# Patient Record
Sex: FEMALE | Race: WHITE | NOT HISPANIC OR LATINO | Employment: UNEMPLOYED | URBAN - METROPOLITAN AREA
[De-identification: names, ages, dates, MRNs, and addresses within clinical notes are randomized per-mention and may not be internally consistent; named-entity substitution may affect disease eponyms.]

---

## 2023-11-10 ENCOUNTER — HOSPITAL ENCOUNTER (EMERGENCY)
Facility: HOSPITAL | Age: 23
Discharge: HOME | End: 2023-11-10
Attending: EMERGENCY MEDICINE
Payer: MEDICAID

## 2023-11-10 VITALS
HEART RATE: 61 BPM | WEIGHT: 156.53 LBS | OXYGEN SATURATION: 100 % | BODY MASS INDEX: 26.08 KG/M2 | DIASTOLIC BLOOD PRESSURE: 87 MMHG | HEIGHT: 65 IN | TEMPERATURE: 98.1 F | SYSTOLIC BLOOD PRESSURE: 120 MMHG | RESPIRATION RATE: 16 BRPM

## 2023-11-10 DIAGNOSIS — O02.1 MISSED ABORTION: Primary | ICD-10-CM

## 2023-11-10 LAB
ABO GROUP BLD: NORMAL
ALBUMIN SERPL-MCNC: 4.1 G/DL (ref 3.5–5.2)
ALBUMIN/GLOB SERPL: 1.3 G/DL
ALP SERPL-CCNC: 103 U/L (ref 39–117)
ALT SERPL W P-5'-P-CCNC: 13 U/L (ref 1–33)
ANION GAP SERPL CALCULATED.3IONS-SCNC: 9.3 MMOL/L (ref 5–15)
AST SERPL-CCNC: 19 U/L (ref 1–32)
BACTERIA UR QL AUTO: ABNORMAL /HPF
BASOPHILS # BLD AUTO: 0.09 10*3/MM3 (ref 0–0.2)
BASOPHILS NFR BLD AUTO: 1.5 % (ref 0–1.5)
BILIRUB SERPL-MCNC: 0.3 MG/DL (ref 0–1.2)
BILIRUB UR QL STRIP: NEGATIVE
BUN SERPL-MCNC: 8 MG/DL (ref 6–20)
BUN/CREAT SERPL: 11.8 (ref 7–25)
C TRACH RRNA CVX QL NAA+PROBE: NOT DETECTED
CALCIUM SPEC-SCNC: 9.3 MG/DL (ref 8.6–10.5)
CANDIDA SPECIES: NEGATIVE
CHLORIDE SERPL-SCNC: 103 MMOL/L (ref 98–107)
CLARITY UR: CLEAR
CO2 SERPL-SCNC: 24.7 MMOL/L (ref 22–29)
COLOR UR: YELLOW
CREAT SERPL-MCNC: 0.68 MG/DL (ref 0.57–1)
DEPRECATED RDW RBC AUTO: 40.1 FL (ref 37–54)
EGFRCR SERPLBLD CKD-EPI 2021: 124.9 ML/MIN/1.73
EOSINOPHIL # BLD AUTO: 0.13 10*3/MM3 (ref 0–0.4)
EOSINOPHIL NFR BLD AUTO: 2.2 % (ref 0.3–6.2)
ERYTHROCYTE [DISTWIDTH] IN BLOOD BY AUTOMATED COUNT: 12.3 % (ref 12.3–15.4)
GARDNERELLA VAGINALIS: NEGATIVE
GLOBULIN UR ELPH-MCNC: 3.2 GM/DL
GLUCOSE SERPL-MCNC: 85 MG/DL (ref 65–99)
GLUCOSE UR STRIP-MCNC: NEGATIVE MG/DL
HCG INTACT+B SERPL-ACNC: 1.37 MIU/ML
HCT VFR BLD AUTO: 41.8 % (ref 34–46.6)
HGB BLD-MCNC: 13.3 G/DL (ref 12–15.9)
HGB UR QL STRIP.AUTO: ABNORMAL
HYALINE CASTS UR QL AUTO: ABNORMAL /LPF
IMM GRANULOCYTES # BLD AUTO: 0.01 10*3/MM3 (ref 0–0.05)
IMM GRANULOCYTES NFR BLD AUTO: 0.2 % (ref 0–0.5)
KETONES UR QL STRIP: NEGATIVE
LEUKOCYTE ESTERASE UR QL STRIP.AUTO: NEGATIVE
LYMPHOCYTES # BLD AUTO: 2.61 10*3/MM3 (ref 0.7–3.1)
LYMPHOCYTES NFR BLD AUTO: 44.1 % (ref 19.6–45.3)
MCH RBC QN AUTO: 27.9 PG (ref 26.6–33)
MCHC RBC AUTO-ENTMCNC: 31.8 G/DL (ref 31.5–35.7)
MCV RBC AUTO: 87.8 FL (ref 79–97)
MONOCYTES # BLD AUTO: 0.54 10*3/MM3 (ref 0.1–0.9)
MONOCYTES NFR BLD AUTO: 9.1 % (ref 5–12)
N GONORRHOEA RRNA SPEC QL NAA+PROBE: NOT DETECTED
NEUTROPHILS NFR BLD AUTO: 2.54 10*3/MM3 (ref 1.7–7)
NEUTROPHILS NFR BLD AUTO: 42.9 % (ref 42.7–76)
NITRITE UR QL STRIP: NEGATIVE
NRBC BLD AUTO-RTO: 0 /100 WBC (ref 0–0.2)
PH UR STRIP.AUTO: 5.5 [PH] (ref 5–8)
PLATELET # BLD AUTO: 320 10*3/MM3 (ref 140–450)
PMV BLD AUTO: 10.2 FL (ref 6–12)
POTASSIUM SERPL-SCNC: 3.7 MMOL/L (ref 3.5–5.2)
PROT SERPL-MCNC: 7.3 G/DL (ref 6–8.5)
PROT UR QL STRIP: NEGATIVE
RBC # BLD AUTO: 4.76 10*6/MM3 (ref 3.77–5.28)
RBC # UR STRIP: ABNORMAL /HPF
REF LAB TEST METHOD: ABNORMAL
RH BLD: NEGATIVE
SODIUM SERPL-SCNC: 137 MMOL/L (ref 136–145)
SP GR UR STRIP: 1.02 (ref 1–1.03)
SQUAMOUS #/AREA URNS HPF: ABNORMAL /HPF
T VAGINALIS DNA VAG QL PROBE+SIG AMP: NEGATIVE
UROBILINOGEN UR QL STRIP: ABNORMAL
WBC # UR STRIP: ABNORMAL /HPF
WBC NRBC COR # BLD: 5.92 10*3/MM3 (ref 3.4–10.8)

## 2023-11-10 PROCEDURE — 87480 CANDIDA DNA DIR PROBE: CPT | Performed by: EMERGENCY MEDICINE

## 2023-11-10 PROCEDURE — 87491 CHLMYD TRACH DNA AMP PROBE: CPT | Performed by: EMERGENCY MEDICINE

## 2023-11-10 PROCEDURE — 36415 COLL VENOUS BLD VENIPUNCTURE: CPT

## 2023-11-10 PROCEDURE — 80053 COMPREHEN METABOLIC PANEL: CPT | Performed by: EMERGENCY MEDICINE

## 2023-11-10 PROCEDURE — 86900 BLOOD TYPING SEROLOGIC ABO: CPT | Performed by: EMERGENCY MEDICINE

## 2023-11-10 PROCEDURE — 81001 URINALYSIS AUTO W/SCOPE: CPT | Performed by: EMERGENCY MEDICINE

## 2023-11-10 PROCEDURE — 86901 BLOOD TYPING SEROLOGIC RH(D): CPT | Performed by: EMERGENCY MEDICINE

## 2023-11-10 PROCEDURE — 85025 COMPLETE CBC W/AUTO DIFF WBC: CPT | Performed by: EMERGENCY MEDICINE

## 2023-11-10 PROCEDURE — 99282 EMERGENCY DEPT VISIT SF MDM: CPT

## 2023-11-10 PROCEDURE — 87510 GARDNER VAG DNA DIR PROBE: CPT | Performed by: EMERGENCY MEDICINE

## 2023-11-10 PROCEDURE — 84702 CHORIONIC GONADOTROPIN TEST: CPT | Performed by: EMERGENCY MEDICINE

## 2023-11-10 PROCEDURE — 87591 N.GONORRHOEAE DNA AMP PROB: CPT | Performed by: EMERGENCY MEDICINE

## 2023-11-10 PROCEDURE — 87660 TRICHOMONAS VAGIN DIR PROBE: CPT | Performed by: EMERGENCY MEDICINE

## 2023-11-10 NOTE — Clinical Note
[facility] EMERGENCY ROOM  [address]  [place] [address]  Phone: [phone]    April Santos was seen and treated in our emergency department on 11/10/2023.  She may return to work on 11/13/2023.         Thank you for choosing [facility].    Kelli Lawrence, APRN

## 2023-11-10 NOTE — ED PROVIDER NOTES
Time: 11:14 AM EST  Date of encounter:  11/10/2023  Independent Historian/Clinical History and Information was obtained by:   Patient and Family    History is limited by: N/A    Chief Complaint: vaginal bleeding      History of Present Illness:  Patient is a 23 y.o. year old female who presents to the emergency department for evaluation of vaginal bleeding today after having intercourse. She says she had several positive pregnancy tests at home 6 days ago. She reports soaking one panty liner since this morning and says she is having some lower back cramping.     HPI    Patient Care Team  Primary Care Provider: Walter, Sruthi Known    Past Medical History:     Allergies   Allergen Reactions    Penicillins Rash     Past Medical History:   Diagnosis Date    Migraine      History reviewed. No pertinent surgical history.  Family History   Problem Relation Age of Onset    Cancer Father         UNKNOWN TYPE    Ovarian cancer Paternal Grandmother         UNSURE, POOR HISTORIAN    Uterine cancer Paternal Grandmother         POOR HISTORIAN    Breast cancer Paternal Grandmother 50    Uterine cancer Maternal Grandmother     Prostate cancer Neg Hx     Colon cancer Neg Hx        Home Medications:  Prior to Admission medications    Medication Sig Start Date End Date Taking? Authorizing Provider   Levonorgestrel (MIRENA) 20 MCG/DAY intrauterine device IUD 1 each by Intrauterine route. WILL BE REMOVING 9/19/2023  11/10/23  ProviderEliza MD        Social History:   Social History     Tobacco Use    Smoking status: Never    Smokeless tobacco: Never   Vaping Use    Vaping Use: Never used   Substance Use Topics    Alcohol use: Yes     Comment: Ocassional    Drug use: Never         Review of Systems:  Review of Systems   Constitutional: Negative.    HENT: Negative.     Eyes: Negative.    Respiratory: Negative.     Cardiovascular: Negative.    Gastrointestinal: Negative.    Endocrine: Negative.    Genitourinary:  Positive for  "vaginal bleeding.   Musculoskeletal: Negative.    Skin: Negative.    Allergic/Immunologic: Negative.    Neurological: Negative.    Hematological: Negative.    Psychiatric/Behavioral: Negative.          Physical Exam:  /87 (BP Location: Right arm, Patient Position: Sitting)   Pulse 61   Temp 98.1 °F (36.7 °C) (Oral)   Resp 16   Ht 165.1 cm (65\")   Wt 71 kg (156 lb 8.4 oz)   LMP 09/22/2023   SpO2 100%   BMI 26.05 kg/m²     Physical Exam  Constitutional:       Appearance: Normal appearance.   HENT:      Head: Normocephalic and atraumatic.      Nose: Nose normal.      Mouth/Throat:      Mouth: Mucous membranes are moist.   Eyes:      Pupils: Pupils are equal, round, and reactive to light.   Cardiovascular:      Rate and Rhythm: Normal rate and regular rhythm.      Pulses: Normal pulses.   Pulmonary:      Effort: Pulmonary effort is normal.      Breath sounds: Normal breath sounds.   Abdominal:      General: Abdomen is flat. Bowel sounds are normal.      Palpations: Abdomen is soft.      Tenderness: There is no abdominal tenderness.   Musculoskeletal:         General: Normal range of motion.      Cervical back: Normal range of motion.   Skin:     General: Skin is warm and dry.      Capillary Refill: Capillary refill takes less than 2 seconds.   Neurological:      General: No focal deficit present.      Mental Status: She is alert and oriented to person, place, and time. Mental status is at baseline.   Psychiatric:         Mood and Affect: Mood normal.                  Procedures:  Procedures      Medical Decision Making:      Comorbidities that affect care:    None    External Notes reviewed:    None      The following orders were placed and all results were independently analyzed by me:  Orders Placed This Encounter   Procedures    Gardnerella vaginalis, Trichomonas vaginalis, Candida albicans, DNA - Swab, Vagina    Chlamydia trachomatis, Neisseria gonorrhoeae, PCR - Swab, Cervix    Comprehensive Metabolic " Panel    hCG, Quantitative, Pregnancy    Urinalysis With Microscopic If Indicated (No Culture) - Urine, Clean Catch    CBC Auto Differential    Urinalysis, Microscopic Only - Urine, Clean Catch    ABO / Rh    CBC & Differential       Medications Given in the Emergency Department:  Medications - No data to display     ED Course:         Labs:    Lab Results (last 24 hours)       Procedure Component Value Units Date/Time    Gardnerella vaginalis, Trichomonas vaginalis, Candida albicans, DNA - Swab, Vagina [[id number]]  (Normal) Collected: 11/10/23 1108    Specimen: Swab from Vagina Updated: 11/10/23 1213     GARDNERELLA VAGINALIS Negative     TRICHOMONAS VAGINALIS Negative     CANDIDA SPECIES Negative    Chlamydia trachomatis, Neisseria gonorrhoeae, PCR - Swab, Cervix [[id number]]  (Normal) Collected: 11/10/23 1108    Specimen: Swab from Cervix Updated: 11/10/23 1255     Chlamydia DNA by PCR Not Detected     Neisseria gonorrhoeae by PCR Not Detected    Urinalysis With Microscopic If Indicated (No Culture) - Urine, Clean Catch [[id number]]  (Abnormal) Collected: 11/10/23 1108    Specimen: Urine, Clean Catch Updated: 11/10/23 1129     Color, UA Yellow     Appearance, UA Clear     pH, UA 5.5     Specific Gravity, UA 1.024     Glucose, UA Negative     Ketones, UA Negative     Bilirubin, UA Negative     Blood, UA Moderate (2+)     Protein, UA Negative     Leuk Esterase, UA Negative     Nitrite, UA Negative     Urobilinogen, UA 0.2 E.U./dL    Urinalysis, Microscopic Only - Urine, Clean Catch [[id number]]  (Abnormal) Collected: 11/10/23 1108    Specimen: Urine, Clean Catch Updated: 11/10/23 1129     RBC, UA 21-50 /HPF      WBC, UA 0-2 /HPF      Bacteria, UA None Seen /HPF      Squamous Epithelial Cells, UA 0-2 /HPF      Hyaline Casts, UA 0-2 /LPF      Methodology Automated Microscopy    CBC & Differential [[id number]]  (Normal) Collected: 11/10/23 1114    Specimen: Blood Updated: 11/10/23 1125    Narrative:      The following  orders were created for panel order CBC & Differential.  Procedure                               Abnormality         Status                     ---------                               -----------         ------                     CBC Auto Differential[[id number]]        Normal              Final result                 Please view results for these tests on the individual orders.    Comprehensive Metabolic Panel [[id number]] Collected: 11/10/23 1114    Specimen: Blood Updated: 11/10/23 1146     Glucose 85 mg/dL      BUN 8 mg/dL      Creatinine 0.68 mg/dL      Sodium 137 mmol/L      Potassium 3.7 mmol/L      Chloride 103 mmol/L      CO2 24.7 mmol/L      Calcium 9.3 mg/dL      Total Protein 7.3 g/dL      Albumin 4.1 g/dL      ALT (SGPT) 13 U/L      AST (SGOT) 19 U/L      Alkaline Phosphatase 103 U/L      Total Bilirubin 0.3 mg/dL      Globulin 3.2 gm/dL      A/G Ratio 1.3 g/dL      BUN/Creatinine Ratio 11.8     Anion Gap 9.3 mmol/L      eGFR 124.9 mL/min/1.73     Narrative:      GFR Normal >60  Chronic Kidney Disease <60  Kidney Failure <15      hCG, Quantitative, Pregnancy [[id number]] Collected: 11/10/23 1114    Specimen: Blood Updated: 11/10/23 1145     HCG Quantitative 1.37 mIU/mL     Narrative:      HCG Ranges by Gestational Age    Females - non-pregnant premenopausal   </= 1mIU/mL HCG  Females - postmenopausal               </= 7mIU/mL HCG    3 Weeks       5.4   -      72 mIU/mL  4 Weeks      10.2   -     708 mIU/mL  5 Weeks       217   -   8,245 mIU/mL  6 Weeks       152   -  32,177 mIU/mL  7 Weeks     4,059   - 153,767 mIU/mL  8 Weeks    31,366   - 149,094 mIU/mL  9 Weeks    59,109   - 135,901 mIU/mL  10 Weeks   44,186   - 170,409 mIU/mL  12 Weeks   27,107   - 201,615 mIU/mL  14 Weeks   24,302   -  93,646 mIU/mL  15 Weeks   12,540   -  69,747 mIU/mL  16 Weeks    8,904   -  55,332 mIU/mL  17 Weeks    8,240   -  51,793 mIU/mL  18 Weeks    9,649   -  55,271 mIU/mL      CBC Auto Differential [[id number]]  (Normal)  Collected: 11/10/23 1114    Specimen: Blood Updated: 11/10/23 1125     WBC 5.92 10*3/mm3      RBC 4.76 10*6/mm3      Hemoglobin 13.3 g/dL      Hematocrit 41.8 %      MCV 87.8 fL      MCH 27.9 pg      MCHC 31.8 g/dL      RDW 12.3 %      RDW-SD 40.1 fl      MPV 10.2 fL      Platelets 320 10*3/mm3      Neutrophil % 42.9 %      Lymphocyte % 44.1 %      Monocyte % 9.1 %      Eosinophil % 2.2 %      Basophil % 1.5 %      Immature Grans % 0.2 %      Neutrophils, Absolute 2.54 10*3/mm3      Lymphocytes, Absolute 2.61 10*3/mm3      Monocytes, Absolute 0.54 10*3/mm3      Eosinophils, Absolute 0.13 10*3/mm3      Basophils, Absolute 0.09 10*3/mm3      Immature Grans, Absolute 0.01 10*3/mm3      nRBC 0.0 /100 WBC              Imaging:    No Radiology Exams Resulted Within Past 24 Hours      Differential Diagnosis and Discussion:    Vaginal Bleeding: Differential diagnosis includes but is not limited to foreign body, tumor, vaginitis, dysfunctional uterine bleeding, endocrine abnormalities, coagulation disorder, systemic illness, polyps, complications of pregnancy (possible ectopic pregnancy).    All labs were reviewed and interpreted by me.  Ultrasound impression was interpreted by me.     MDM     Amount and/or Complexity of Data Reviewed  Clinical lab tests: reviewed                 Patient Care Considerations:           Consultants/Shared Management Plan:    None    Social Determinants of Health:    Patient is independent, reliable, and has access to care.       Disposition and Care Coordination:    Discharged: The patient is suitable and stable for discharge with no need for consideration of observation or admission.    I have explained the patient´s condition, diagnoses and treatment plan based on the information available to me at this time. I have answered questions and addressed any concerns. The patient has a good  understanding of the patient´s diagnosis, condition, and treatment plan as can be expected at this point.  The vital signs have been stable. The patient´s condition is stable and appropriate for discharge from the emergency department.      The patient will pursue further outpatient evaluation with the primary care physician or other designated or consulting physician as outlined in the discharge instructions. They are agreeable to this plan of care and follow-up instructions have been explained in detail. The patient has received these instructions in written format and have expressed an understanding of the discharge instructions. The patient is aware that any significant change in condition or worsening of symptoms should prompt an immediate return to this or the closest emergency department or call to 911.  I have explained discharge medications and the need for follow up with the patient/caretakers. This was also printed in the discharge instructions. Patient was discharged with the following medications and follow up:      Medication List      No changes were made to your prescriptions during this visit.      Provider, No Known  [facility]  [place] [address]      as needed       Final diagnoses:   Missed         ED Disposition       ED Disposition   Discharge    Condition   Stable    Comment   --               This medical record created using voice recognition software.             Kelli Lawrence, APRN  11/10/23 1068

## 2024-02-12 DIAGNOSIS — O36.80X0 PREGNANCY WITH INCONCLUSIVE FETAL VIABILITY, SINGLE OR UNSPECIFIED FETUS: Primary | ICD-10-CM

## 2024-03-12 ENCOUNTER — HOSPITAL ENCOUNTER (OUTPATIENT)
Dept: ULTRASOUND IMAGING | Facility: HOSPITAL | Age: 24
Discharge: HOME | End: 2024-03-12
Admitting: NURSE PRACTITIONER
Payer: COMMERCIAL

## 2024-03-12 DIAGNOSIS — O36.80X0 PREGNANCY WITH INCONCLUSIVE FETAL VIABILITY, SINGLE OR UNSPECIFIED FETUS: ICD-10-CM

## 2024-03-12 PROCEDURE — 76817 TRANSVAGINAL US OBSTETRIC: CPT

## 2024-03-13 ENCOUNTER — TELEPHONE (OUTPATIENT)
Dept: OBSTETRICS AND GYNECOLOGY | Facility: CLINIC | Age: 24
End: 2024-03-13
Payer: COMMERCIAL

## 2024-03-13 NOTE — TELEPHONE ENCOUNTER
----- Message from JL Gomez sent at 3/13/2024  8:48 AM EDT -----  Please let patient know her ultrasound shows a living pregnancy which measured 8w2d, heart rate was 163.  Please confirm LMP and review bleeding precautions.  Recommend new OB appointment in 2-3 weeks.

## 2024-04-04 ENCOUNTER — INITIAL PRENATAL (OUTPATIENT)
Dept: OBSTETRICS AND GYNECOLOGY | Facility: CLINIC | Age: 24
End: 2024-04-04
Payer: COMMERCIAL

## 2024-04-04 VITALS — WEIGHT: 155 LBS | DIASTOLIC BLOOD PRESSURE: 76 MMHG | SYSTOLIC BLOOD PRESSURE: 113 MMHG | BODY MASS INDEX: 25.79 KG/M2

## 2024-04-04 DIAGNOSIS — Z34.80 SUPERVISION OF OTHER NORMAL PREGNANCY, ANTEPARTUM: Primary | ICD-10-CM

## 2024-04-04 LAB
ABO GROUP BLD: NORMAL
AMPHET+METHAMPHET UR QL: NEGATIVE
B-HCG UR QL: POSITIVE
BARBITURATES UR QL SCN: NEGATIVE
BASOPHILS # BLD AUTO: 0.06 10*3/MM3 (ref 0–0.2)
BASOPHILS NFR BLD AUTO: 0.6 % (ref 0–1.5)
BENZODIAZ UR QL SCN: NEGATIVE
BLD GP AB SCN SERPL QL: NEGATIVE
CANNABINOIDS SERPL QL: NEGATIVE
COCAINE UR QL: NEGATIVE
DEPRECATED RDW RBC AUTO: 39.1 FL (ref 37–54)
EOSINOPHIL # BLD AUTO: 0.11 10*3/MM3 (ref 0–0.4)
EOSINOPHIL NFR BLD AUTO: 1.1 % (ref 0.3–6.2)
ERYTHROCYTE [DISTWIDTH] IN BLOOD BY AUTOMATED COUNT: 12.3 % (ref 12.3–15.4)
EXPIRATION DATE: ABNORMAL
FENTANYL UR-MCNC: NEGATIVE NG/ML
GLUCOSE UR STRIP-MCNC: NEGATIVE MG/DL
HBV SURFACE AG SERPL QL IA: NORMAL
HCT VFR BLD AUTO: 38 % (ref 34–46.6)
HCV AB SER DONR QL: NORMAL
HGB BLD-MCNC: 12.6 G/DL (ref 12–15.9)
HIV 1+2 AB+HIV1 P24 AG SERPL QL IA: NORMAL
IMM GRANULOCYTES # BLD AUTO: 0.04 10*3/MM3 (ref 0–0.05)
IMM GRANULOCYTES NFR BLD AUTO: 0.4 % (ref 0–0.5)
INTERNAL NEGATIVE CONTROL: NEGATIVE
INTERNAL POSITIVE CONTROL: POSITIVE
LYMPHOCYTES # BLD AUTO: 2.29 10*3/MM3 (ref 0.7–3.1)
LYMPHOCYTES NFR BLD AUTO: 22.4 % (ref 19.6–45.3)
Lab: ABNORMAL
MCH RBC QN AUTO: 29 PG (ref 26.6–33)
MCHC RBC AUTO-ENTMCNC: 33.2 G/DL (ref 31.5–35.7)
MCV RBC AUTO: 87.6 FL (ref 79–97)
METHADONE UR QL SCN: NEGATIVE
MONOCYTES # BLD AUTO: 0.48 10*3/MM3 (ref 0.1–0.9)
MONOCYTES NFR BLD AUTO: 4.7 % (ref 5–12)
NEUTROPHILS NFR BLD AUTO: 7.23 10*3/MM3 (ref 1.7–7)
NEUTROPHILS NFR BLD AUTO: 70.8 % (ref 42.7–76)
NRBC BLD AUTO-RTO: 0 /100 WBC (ref 0–0.2)
OPIATES UR QL: NEGATIVE
OXYCODONE UR QL SCN: NEGATIVE
PLATELET # BLD AUTO: 301 10*3/MM3 (ref 140–450)
PMV BLD AUTO: 11.2 FL (ref 6–12)
PROT UR STRIP-MCNC: NEGATIVE MG/DL
RBC # BLD AUTO: 4.34 10*6/MM3 (ref 3.77–5.28)
RH BLD: NEGATIVE
T PALLIDUM IGG SER QL: NORMAL
WBC NRBC COR # BLD AUTO: 10.21 10*3/MM3 (ref 3.4–10.8)

## 2024-04-04 PROCEDURE — 87340 HEPATITIS B SURFACE AG IA: CPT | Performed by: NURSE PRACTITIONER

## 2024-04-04 PROCEDURE — 86762 RUBELLA ANTIBODY: CPT | Performed by: NURSE PRACTITIONER

## 2024-04-04 PROCEDURE — 87624 HPV HI-RISK TYP POOLED RSLT: CPT | Performed by: NURSE PRACTITIONER

## 2024-04-04 PROCEDURE — G0123 SCREEN CERV/VAG THIN LAYER: HCPCS | Performed by: NURSE PRACTITIONER

## 2024-04-04 PROCEDURE — 87491 CHLMYD TRACH DNA AMP PROBE: CPT | Performed by: NURSE PRACTITIONER

## 2024-04-04 PROCEDURE — 86850 RBC ANTIBODY SCREEN: CPT | Performed by: NURSE PRACTITIONER

## 2024-04-04 PROCEDURE — 87077 CULTURE AEROBIC IDENTIFY: CPT | Performed by: NURSE PRACTITIONER

## 2024-04-04 PROCEDURE — 80307 DRUG TEST PRSMV CHEM ANLYZR: CPT | Performed by: NURSE PRACTITIONER

## 2024-04-04 PROCEDURE — G0432 EIA HIV-1/HIV-2 SCREEN: HCPCS | Performed by: NURSE PRACTITIONER

## 2024-04-04 PROCEDURE — 87591 N.GONORRHOEAE DNA AMP PROB: CPT | Performed by: NURSE PRACTITIONER

## 2024-04-04 PROCEDURE — 86780 TREPONEMA PALLIDUM: CPT | Performed by: NURSE PRACTITIONER

## 2024-04-04 PROCEDURE — 86901 BLOOD TYPING SEROLOGIC RH(D): CPT | Performed by: NURSE PRACTITIONER

## 2024-04-04 PROCEDURE — 87661 TRICHOMONAS VAGINALIS AMPLIF: CPT | Performed by: NURSE PRACTITIONER

## 2024-04-04 PROCEDURE — 85025 COMPLETE CBC W/AUTO DIFF WBC: CPT | Performed by: NURSE PRACTITIONER

## 2024-04-04 PROCEDURE — 86900 BLOOD TYPING SEROLOGIC ABO: CPT | Performed by: NURSE PRACTITIONER

## 2024-04-04 PROCEDURE — 87186 SC STD MICRODIL/AGAR DIL: CPT | Performed by: NURSE PRACTITIONER

## 2024-04-04 PROCEDURE — 87086 URINE CULTURE/COLONY COUNT: CPT | Performed by: NURSE PRACTITIONER

## 2024-04-04 PROCEDURE — 86803 HEPATITIS C AB TEST: CPT | Performed by: NURSE PRACTITIONER

## 2024-04-04 NOTE — PROGRESS NOTES
OB Initial Visit    CC- Here for care of current pregnancy, first visit    Subjective:  23 y.o.  presenting for her first obstetrical visit.    LMP: Patient's last menstrual period was 01/10/2024 (approximate).     Pt has no complaints, is doing well    Happy for pregnancy    Reviewed and updated:  OBHx, GYNHx (STDs), PMHx, Medications, Allergies, PSHx, Social Hx, Preventative Hx (PAP), Hx of abuse/safe environment, Vaccine Hx including hx of chickenpox or vaccine, Genetic Hx (pt, FOB, both families).        Objective:  /76   Wt 70.3 kg (155 lb)   LMP 01/10/2024 (Approximate)   BMI 25.79 kg/m²      Urine pregnancy test is positive     General- NAD, alert and oriented, appropriate  Psych- Normal mood, good memory  Neck- No masses, no thyroid enlargement  CV- Regular rhythm, no murnurs  Resp- CTA to bases, no wheezes  Abdomen- Soft, non distended, non tender, no masses    Breast left- deferred  Breast right- deferred    External genitalia- Normal, no lesions  Urethra- Normal, no masses, non tender  Vagina- Normal, no discharge  Bladder- Normal, no masses, non tender  Cvx- Normal, no lesions, no discharge, no CMT  Uterus- Normal shape and consistency, non tender, Consistent with dates, Bedside US consistent with dates.  -160..    Adnexa- Normal, no mass, non tender    Lymphatic- No palpable neck, axillary, or groin nodes  Ext- No edema, no cyanosis    Skin- No lesions, no rashes, no acanthosis nigricans    Assessment and Plan:  11w4d  Diagnoses and all orders for this visit:    1. Supervision of other normal pregnancy, antepartum (Primary)  Overview:  EDMUND finalized: 10/20/24 per 8-week US and ACOG    Optional testing NIPS,CF/SMA,AFP:  Patient is considering    COVID: Fully vaccinated  Flu:  Tdap:  RSV:    Rhogam:  28-32 weeks repeat TPA:  ? Desires Sterilization:    Anatomy US:  FU US:    PROBLEM LIST/PLAN:           Orders:  -     POC Urinalysis Dipstick  -     IGP,CtNgTv,Apt HPV,rfx 16 /  18,45  -     OB Panel With HIV  -     Urine Culture - Urine, Urine, Clean Catch  -     Urine Drug Screen - Urine, Clean Catch  -     Hemoglobinopathy Fractionation Cache  -     POC Pregnancy, Urine        Genetic Screening:   Considering   CF  NIPS    Vaccines:   s/p COVID vaccine    Counseling:   Nutrition discussed, calories, activity/exercise in pregnancy  Discussed dietary restrictions/safety food preparation in pregnancy  Reviewed what to expect prenatal visits, office providers (female and male) and covering [facility]/Dr. Meier  Appropriate trimester precautions provided, N/V, vag bleeding, cramping  Questions answered    Labs:   Prenatal labs, cultures, and PAP performed (prn)    Return in about 4 weeks (around 5/2/2024) for [facility], OB follow up.      Margarito Gong, APRN  04/04/2024    [facility] [address]  [facility]  [address] [place] [address]  Dept: [phone]  Dept Fax: [phone]  Loc: [phone]

## 2024-04-05 PROBLEM — O26.899 RH NEGATIVE, ANTEPARTUM: Status: ACTIVE | Noted: 2024-04-05

## 2024-04-05 PROBLEM — Z67.91 RH NEGATIVE, ANTEPARTUM: Status: ACTIVE | Noted: 2024-04-05

## 2024-04-06 LAB
BACTERIA SPEC AEROBE CULT: ABNORMAL
RUBV IGG SERPL IA-ACNC: 1.13 INDEX

## 2024-04-08 ENCOUNTER — TELEPHONE (OUTPATIENT)
Dept: OBSTETRICS AND GYNECOLOGY | Facility: CLINIC | Age: 24
End: 2024-04-08
Payer: COMMERCIAL

## 2024-04-08 DIAGNOSIS — O23.40 URINARY TRACT INFECTION IN MOTHER DURING PREGNANCY, ANTEPARTUM: Primary | ICD-10-CM

## 2024-04-08 LAB
C TRACH RRNA CVX QL NAA+PROBE: NEGATIVE
CYTOLOGIST CVX/VAG CYTO: NORMAL
CYTOLOGY CVX/VAG DOC CYTO: NORMAL
CYTOLOGY CVX/VAG DOC THIN PREP: NORMAL
DX ICD CODE: NORMAL
HGB A MFR BLD ELPH: 97.3 % (ref 96.4–98.8)
HGB A2 MFR BLD ELPH: 2.7 % (ref 1.8–3.2)
HGB F MFR BLD ELPH: 0 % (ref 0–2)
HGB FRACT BLD-IMP: NORMAL
HGB S MFR BLD ELPH: 0 %
HPV GENOTYPE REFLEX: NORMAL
HPV I/H RISK 4 DNA CVX QL PROBE+SIG AMP: NEGATIVE
Lab: NORMAL
N GONORRHOEA RRNA CVX QL NAA+PROBE: NEGATIVE
OTHER STN SPEC: NORMAL
STAT OF ADQ CVX/VAG CYTO-IMP: NORMAL
T VAGINALIS RRNA SPEC QL NAA+PROBE: NEGATIVE

## 2024-04-08 RX ORDER — CEPHALEXIN 500 MG/1
500 CAPSULE ORAL 4 TIMES DAILY
Qty: 28 CAPSULE | Refills: 0 | Status: SHIPPED | OUTPATIENT
Start: 2024-04-08 | End: 2024-04-15

## 2024-04-08 NOTE — TELEPHONE ENCOUNTER
Patient is unsure if she had ever had that before is willing to try medication and advised if any reactions occur to stop and call us to send something else in.

## 2024-04-08 NOTE — TELEPHONE ENCOUNTER
"Called patient left message urine indicates she has a UTI, will send prescription to her pharmacy. Need to know if patient  can tolerate taking cephalexin due to potential for cross-sensitivity with her penicillin allergy. To call back to let us know. \"HUB TO READ\".  "

## 2024-04-08 NOTE — TELEPHONE ENCOUNTER
----- Message from JL Gomez sent at 4/8/2024  1:43 PM EDT -----  Please let patient know she has a UTI, will send prescription to her pharmacy.   Please ask if patient tolerates taking cephalexin due to potential for cross-sensitivity with her penicillin allergy.

## 2024-05-08 ENCOUNTER — ROUTINE PRENATAL (OUTPATIENT)
Dept: OBSTETRICS AND GYNECOLOGY | Facility: CLINIC | Age: 24
End: 2024-05-08
Payer: COMMERCIAL

## 2024-05-08 ENCOUNTER — TELEPHONE (OUTPATIENT)
Dept: OBSTETRICS AND GYNECOLOGY | Facility: CLINIC | Age: 24
End: 2024-05-08
Payer: COMMERCIAL

## 2024-05-08 VITALS — BODY MASS INDEX: 25.13 KG/M2 | DIASTOLIC BLOOD PRESSURE: 62 MMHG | WEIGHT: 151 LBS | SYSTOLIC BLOOD PRESSURE: 109 MMHG

## 2024-05-08 DIAGNOSIS — O23.40 URINARY TRACT INFECTION IN MOTHER DURING PREGNANCY, ANTEPARTUM: ICD-10-CM

## 2024-05-08 DIAGNOSIS — O99.891 ASYMPTOMATIC BACTERIURIA DURING PREGNANCY: ICD-10-CM

## 2024-05-08 DIAGNOSIS — R82.71 ASYMPTOMATIC BACTERIURIA DURING PREGNANCY: ICD-10-CM

## 2024-05-08 DIAGNOSIS — Z34.80 SUPERVISION OF OTHER NORMAL PREGNANCY, ANTEPARTUM: Primary | ICD-10-CM

## 2024-05-08 DIAGNOSIS — Z67.91 RH NEGATIVE, ANTEPARTUM: ICD-10-CM

## 2024-05-08 DIAGNOSIS — O26.899 RH NEGATIVE, ANTEPARTUM: ICD-10-CM

## 2024-05-08 LAB
GLUCOSE UR STRIP-MCNC: NEGATIVE MG/DL
PROT UR STRIP-MCNC: NEGATIVE MG/DL

## 2024-05-08 PROCEDURE — 87086 URINE CULTURE/COLONY COUNT: CPT | Performed by: STUDENT IN AN ORGANIZED HEALTH CARE EDUCATION/TRAINING PROGRAM

## 2024-05-08 RX ORDER — CALCIUM CARBONATE 300MG(750)
1 TABLET,CHEWABLE ORAL DAILY
Qty: 30 TABLET | Refills: 11 | Status: SHIPPED | OUTPATIENT
Start: 2024-05-08

## 2024-05-09 LAB — BACTERIA SPEC AEROBE CULT: NO GROWTH

## 2024-06-07 ENCOUNTER — ROUTINE PRENATAL (OUTPATIENT)
Dept: OBSTETRICS AND GYNECOLOGY | Facility: CLINIC | Age: 24
End: 2024-06-07
Payer: COMMERCIAL

## 2024-06-07 VITALS — DIASTOLIC BLOOD PRESSURE: 75 MMHG | BODY MASS INDEX: 25.79 KG/M2 | SYSTOLIC BLOOD PRESSURE: 125 MMHG | WEIGHT: 155 LBS

## 2024-06-07 DIAGNOSIS — Z67.91 RH NEGATIVE, ANTEPARTUM: ICD-10-CM

## 2024-06-07 DIAGNOSIS — O26.899 RH NEGATIVE, ANTEPARTUM: ICD-10-CM

## 2024-06-07 DIAGNOSIS — Z34.80 SUPERVISION OF OTHER NORMAL PREGNANCY, ANTEPARTUM: Primary | ICD-10-CM

## 2024-06-07 PROBLEM — R82.71 ASYMPTOMATIC BACTERIURIA DURING PREGNANCY: Status: RESOLVED | Noted: 2024-04-08 | Resolved: 2024-06-07

## 2024-06-07 PROBLEM — O99.891 ASYMPTOMATIC BACTERIURIA DURING PREGNANCY: Status: RESOLVED | Noted: 2024-04-08 | Resolved: 2024-06-07

## 2024-06-07 LAB
GLUCOSE UR STRIP-MCNC: NEGATIVE MG/DL
PROT UR STRIP-MCNC: NEGATIVE MG/DL

## 2024-06-07 NOTE — PROGRESS NOTES
OB FOLLOW UP  Complaint   Chief Complaint   Patient presents with    Routine Prenatal Visit            April Santos is a 23 y.o.  20w5d patient being seen today for her obstetrical follow up visit. Patient denies decreased fetal movement, contractions, loss of fluid or vaginal bleeding.  No acute complaints.  Compliant with prenatal vitamin.    Her prenatal care is complicated by (and status) :    Patient Active Problem List   Diagnosis    Supervision of other normal pregnancy, antepartum    Rh negative, antepartum       All other systems reviewed and are negative.     The additional following portions of the patient's history were reviewed and updated as appropriate: allergies, current medications, past family history, past medical history, past social history, past surgical history, and problem list.      EXAM:     Vital signs: /75   Wt 70.3 kg (155 lb)   LMP 01/10/2024 (Approximate)   BMI 25.79 kg/m²   Appearance/psychiatric: To be in no distress  Constitutional: The patient is well nourished.  Cardiovascular: She does not have edema.  Respiratory: Respiratory effort is normal.  Gastrointestinal: Abdomen is soft, gravid, nontender, no rashes, heart tones are present, fundal height is size equals dates    Pelvic Exam: No    Urine glucose/protein: See prenatal flowsheet       Assessment and Plan    Problem List Items Addressed This Visit          Gravid and     Rh negative, antepartum    Overview     Plan RhoGAM at 28 weeks         Supervision of other normal pregnancy, antepartum - Primary    Overview     EDMUND finalized: 10/20/24 per 8-week US and ACOG    Optional testing NIPS,CF/SMA,AFP:  Patient is considering    COVID: Fully vaccinated  Flu:  Tdap:  RSV:    28-32 weeks repeat TPA:  ? Desires Sterilization:    Anatomy US: 2024 EFW (45) AC (45) Br PP, XY normal anatomical study.  Follow-up as clinically indicated.  FU US:    PROBLEM LIST/PLAN:                Relevant Medications     Prenatal MV-Min-FA-Omega-3 (Prenatal Gummies/DHA & FA) 0.4-32.5 MG chewable tablet    Other Relevant Orders    POC Urinalysis Dipstick (Completed)       Impression  Pregnancy at 20w5d  Fetal status reassuring.   Activity and Exercise discussed.    Plan  Anatomy ultrasound reviewed with patient and partner.  Discussed limitations of ultrasound.  Normal anatomical study today in office.  Follow-up as clinically indicated.  Review of urine culture at last visit negative.  Resolution of asymptomatic bacteriuria in pregnancy  Continue with prenatal vitamin daily  RhoGAM at 28-week interval.  Bleeding precautions provided for evaluation if sooner.  Return to office in 4 weeks for 1 hour glucose testing and anemia screening with CBC.      Patient was counseled to the following pregnancy precautions:  Decreased fetal movement, if concern for decreased fetal movement please perform fetal kick counts you are looking for 10 movements in 2 hours.  If concern for fetal movement and not meeting that criteria, please present to triage for evaluation.  Contractions occurring every 5 minutes for over an hour, lasting 30 to 60 seconds and progressively causing more discomfort, please seek medical attention to rule out labor  If you believe that your water is broken, place a sanitary pad.  If pad fills in short period of time i.e. less than 5 minutes, take off pad placed another pad.  If this is saturated please present for rule out rupture of membranes  Vaginal bleeding can be normal in pregnancy, this usually takes a form of spotting.  If having heavier bleeding like a menstrual period please present for evaluation; especially in light of severe abdominal pain this could represent a placental abruption.  Keep all scheduled appointments as recommended.        Shoaib Moreira MD  06/07/2024

## 2024-07-05 ENCOUNTER — ROUTINE PRENATAL (OUTPATIENT)
Dept: OBSTETRICS AND GYNECOLOGY | Facility: CLINIC | Age: 24
End: 2024-07-05
Payer: COMMERCIAL

## 2024-07-05 VITALS — WEIGHT: 160 LBS | DIASTOLIC BLOOD PRESSURE: 76 MMHG | SYSTOLIC BLOOD PRESSURE: 116 MMHG | BODY MASS INDEX: 26.63 KG/M2

## 2024-07-05 DIAGNOSIS — Z34.80 SUPERVISION OF OTHER NORMAL PREGNANCY, ANTEPARTUM: Primary | ICD-10-CM

## 2024-07-05 LAB
DEPRECATED RDW RBC AUTO: 37.9 FL (ref 37–54)
ERYTHROCYTE [DISTWIDTH] IN BLOOD BY AUTOMATED COUNT: 11.9 % (ref 12.3–15.4)
GLUCOSE 1H P GLC SERPL-MCNC: 87 MG/DL (ref 65–139)
GLUCOSE UR STRIP-MCNC: NEGATIVE MG/DL
HCT VFR BLD AUTO: 35.4 % (ref 34–46.6)
HGB BLD-MCNC: 11.5 G/DL (ref 12–15.9)
MCH RBC QN AUTO: 28.5 PG (ref 26.6–33)
MCHC RBC AUTO-ENTMCNC: 32.5 G/DL (ref 31.5–35.7)
MCV RBC AUTO: 87.6 FL (ref 79–97)
PLATELET # BLD AUTO: 248 10*3/MM3 (ref 140–450)
PMV BLD AUTO: 11.4 FL (ref 6–12)
PROT UR STRIP-MCNC: NEGATIVE MG/DL
RBC # BLD AUTO: 4.04 10*6/MM3 (ref 3.77–5.28)
WBC NRBC COR # BLD AUTO: 10.84 10*3/MM3 (ref 3.4–10.8)

## 2024-07-05 PROCEDURE — 82950 GLUCOSE TEST: CPT | Performed by: OBSTETRICS & GYNECOLOGY

## 2024-07-05 PROCEDURE — 85027 COMPLETE CBC AUTOMATED: CPT | Performed by: OBSTETRICS & GYNECOLOGY

## 2024-07-05 NOTE — PROGRESS NOTES
OB FOLLOW UP  CC- Here for care of pregnancy        April Santos is a 23 y.o.  24w5d patient being seen today for her obstetrical follow up visit. Patient reports no complaints.     Her prenatal care is complicated by (and status) :    Patient Active Problem List   Diagnosis    Supervision of other normal pregnancy, antepartum    Rh negative, antepartum       Flu Status:   Covid Status:    ROS -   Patient Reports : No Problems  Patient Denies: Loss of Fluid and Vaginal Spotting  Fetal Movement : normal  All other systems reviewed and are negative.       The additional following portions of the patient's history were reviewed and updated as appropriate: allergies, current medications, past family history, past medical history, past social history, past surgical history, and problem list.    I have reviewed and agree with the HPI, ROS, and historical information as entered above. Helen Wagner, DO    /76   Wt 72.6 kg (160 lb)   LMP 01/10/2024 (Approximate)   BMI 26.63 kg/m²       EXAM:     FHT: 152 BPM   Uterine Size: size equals dates  Pelvic Exam: No    Urine glucose/protein: See prenatal flowsheet       Assessment and Plan  Diagnoses and all orders for this visit:    1. Supervision of other normal pregnancy, antepartum (Primary)  Overview:  EDMUND finalized: 10/20/24 per 8-week US and ACOG    Optional testing NIPS,CF/SMA,AFP:  Patient is considering    COVID: Fully vaccinated  Flu:  Tdap:  RSV:    28-32 weeks repeat TPA:  ? Desires Sterilization:    Anatomy US: 2024 EFW (45) AC (45) Br PP, XY normal anatomical study.  Follow-up as clinically indicated.  FU US:    PROBLEM LIST/PLAN:           Orders:  -     POC Urinalysis Dipstick  -     CBC (No Diff)  -     Gestational Diabetes Screen 1 Hour         Pregnancy at 24w5d  Fetal status reassuring.   Activity and Exercise discussed.  Return in about 4 weeks (around 2024) for rotate providers.  1 hour Glucola today  RhoGAM with next  visit    Helen Wagner,   07/05/2024

## 2024-08-05 NOTE — PROGRESS NOTES
Routine Prenatal Visit     Subjective  April Santos is a 24 y.o.  at 29w2d here for her routine OB visit.   She is taking her prenatal vitamins.Reports no loss of fluid or vaginal bleeding. Patient doing well without any complaints. Pregnancy complicated by:     Patient Active Problem List   Diagnosis    Supervision of other normal pregnancy, antepartum    Rh negative, antepartum         OB History    Para Term  AB Living   3 1 1 0 1 1   SAB IAB Ectopic Molar Multiple Live Births   1 0 0 0 0 1      # Outcome Date GA Lbr Randal/2nd Weight Sex Type Anes PTL Lv   3 Current            2 SAB  4w0d    SAB      1 Term 2018 40w0d  2892 g (6 lb 6 oz) M Vag-Spont   VIDAL           ROS:   General ROS: negative for - chills or fatigue  Respiratory ROS: negative for - cough or hemoptysis  Cardiovascular ROS: negative for - chest pain or dyspnea on exertion  Genito-Urinary ROS: negative for  change in urinary stream, vaginal discharge   Musculoskeletal ROS: negative for - gait disturbance or joint pain  Dermatological ROS: negative for acne,  dry skin or itching    Objective  Physical Exam:   Vitals:    24 1132   BP: 106/78       Uterine Size: size equals dates  FHT: 110-160 BPM    General appearance - alert, well appearing, and in no distress  Mental status - alert, oriented to person, place, and time  Abdomen- Soft, Gravid uterus, non-tender to palpation  Musculoskeletal: negative for - gait disturbance or joint pain  Extremeties: negative swelling or cyanosis   Dermatological: negative rashes or skin lesions       Assessment/Plan:   Diagnoses and all orders for this visit:    1. Supervision of other normal pregnancy, antepartum (Primary)  -     POC Urinalysis Dipstick  -     T Pallidum Antibody w/ reflex RPR (Syphilis)    2. Rh negative, antepartum  -     Rhogam Immune Globulin Immunization  -      RhIg Evaluation  -     Doses of rh immune globulin            Counseling:   OB  precautions, leaking, VB, fab delatorre vs PTL/Labor  FKC  HTN precautions reviewed: HA, vision change, RUQ/epigastric pain, edema  Round Ligament Pain:  The uterus has several ligaments which provide support and keep the uterus in place. As the  uterus grows these ligaments are pulled and stretched which often causes sharp stabbing like pain in the inguinal area.   You may find a pregnancy support band helpful. Changing positions may also help. Yoga is a great way to cope with round ligament and low back pain in pregnancy.    Massage may also help with low back pain   Things to Consider at this Point in your Pregnancy:  Some women experience swelling in their feet during pregnancy. Compression stockings may help  Drink plenty of water and stay active   Make sure you are eating frequent small meals, nuts are a wonderful snack to keep with you            Return in about 2 weeks (around 8/20/2024) for Routine OB visit.      We have gone over prenatal care to include the timing and content of visits. I informed her how to contact the office and/or on call person in the event of any problems and encouraged her to do so when she feels it is necessary.  We then spent time answering her questions which she indicated were answered to her satisfaction.    Therese Guerra DO  8/6/2024 11:42 EDT

## 2024-08-06 ENCOUNTER — ROUTINE PRENATAL (OUTPATIENT)
Dept: OBSTETRICS AND GYNECOLOGY | Facility: CLINIC | Age: 24
End: 2024-08-06
Payer: COMMERCIAL

## 2024-08-06 VITALS — WEIGHT: 160 LBS | BODY MASS INDEX: 26.63 KG/M2 | SYSTOLIC BLOOD PRESSURE: 106 MMHG | DIASTOLIC BLOOD PRESSURE: 78 MMHG

## 2024-08-06 DIAGNOSIS — Z34.80 SUPERVISION OF OTHER NORMAL PREGNANCY, ANTEPARTUM: Primary | ICD-10-CM

## 2024-08-06 DIAGNOSIS — Z67.91 RH NEGATIVE, ANTEPARTUM: ICD-10-CM

## 2024-08-06 DIAGNOSIS — O26.899 RH NEGATIVE, ANTEPARTUM: ICD-10-CM

## 2024-08-06 LAB
ABO GROUP BLD: NORMAL
BLD GP AB SCN SERPL QL: NEGATIVE
GLUCOSE UR STRIP-MCNC: NEGATIVE MG/DL
NUMBER OF DOSES: ABNORMAL
PROT UR STRIP-MCNC: NEGATIVE MG/DL
RH BLD: NEGATIVE
TREPONEMA PALLIDUM IGG+IGM AB [PRESENCE] IN SERUM OR PLASMA BY IMMUNOASSAY: NORMAL

## 2024-08-06 PROCEDURE — 96372 THER/PROPH/DIAG INJ SC/IM: CPT | Performed by: OBSTETRICS & GYNECOLOGY

## 2024-08-06 PROCEDURE — 0502F SUBSEQUENT PRENATAL CARE: CPT | Performed by: OBSTETRICS & GYNECOLOGY

## 2024-08-06 PROCEDURE — 86780 TREPONEMA PALLIDUM: CPT | Performed by: OBSTETRICS & GYNECOLOGY

## 2024-08-06 PROCEDURE — 86901 BLOOD TYPING SEROLOGIC RH(D): CPT | Performed by: OBSTETRICS & GYNECOLOGY

## 2024-08-06 PROCEDURE — 86900 BLOOD TYPING SEROLOGIC ABO: CPT | Performed by: OBSTETRICS & GYNECOLOGY

## 2024-08-06 PROCEDURE — 86850 RBC ANTIBODY SCREEN: CPT | Performed by: OBSTETRICS & GYNECOLOGY

## 2024-08-20 NOTE — PROGRESS NOTES
Routine Prenatal Visit     Subjective  April Santos is a 24 y.o.  at 31w3d here for her routine OB visit.   She is taking her prenatal vitamins.Reports no loss of fluid or vaginal bleeding. Patient doing well without any complaints. Pregnancy complicated by:     Patient Active Problem List   Diagnosis    Supervision of other normal pregnancy, antepartum    Rh negative, antepartum         OB History    Para Term  AB Living   3 1 1 0 1 1   SAB IAB Ectopic Molar Multiple Live Births   1 0 0 0 0 1      # Outcome Date GA Lbr Randal/2nd Weight Sex Type Anes PTL Lv   3 Current            2 SAB  4w0d    SAB      1 Term 2018 40w0d  2892 g (6 lb 6 oz) M Vag-Spont   VIDAL           ROS:   General ROS: negative for - chills or fatigue  Genito-Urinary ROS: negative for  change in urinary stream, vaginal discharge     Objective  Physical Exam:   Vitals:    24 0907   BP: 118/75       Uterine Size: size equals dates  FHT: 110-160 BPM    General appearance - alert, well appearing, and in no distress  Abdomen- Soft, Gravid uterus, non-tender to palpation  Extremeties: negative swelling       Assessment/Plan:   Diagnoses and all orders for this visit:    1. Supervision of other normal pregnancy, antepartum (Primary)  Assessment & Plan:  EDMUND finalized: 10/20/24 per 8-week US and ACOG    Optional testing NIPS,CF/SMA,AFP:  not done    COVID: Fully vaccinated  Flu: recommended  Tdap: s/p vaccine  RSV:    28-32 weeks repeat TPA: neg  ? Desires Sterilization:    Anatomy US: 2024 EFW (45) AC (45) Br PP, XY normal anatomical study.  Follow-up as clinically indicated.  FU US: ordered    PROBLEM LIST/PLAN:   S/p rhogam         Orders:  -     POC Urinalysis Dipstick  -     US Ob 14 + Weeks Single or First Gestation; Future    2. Rh negative, antepartum            Counseling:   OB precautions, leaking, VB, fab delatorre vs PTL/Labor  FKC  Round Ligament Pain:  The uterus has several ligaments which  provide support and keep the uterus in place. As the  uterus grows these ligaments are pulled and stretched which often causes sharp stabbing like pain in the inguinal area.   You may find a pregnancy support band helpful. Changing positions may also help. Yoga is a great way to cope with round ligament and low back pain in pregnancy.    Massage may also help with low back pain   Things to Consider at this Point in your Pregnancy:  Some women experience swelling in their feet during pregnancy. Compression stockings may help  Drink plenty of water and stay active   Make sure you are eating frequent small meals, nuts are a wonderful snack to keep with you            Return in about 2 weeks (around 9/4/2024) for Routine OB visit.      We have gone over prenatal care to include the timing and content of visits. I informed her how to contact the office and/or on call person in the event of any problems and encouraged her to do so when she feels it is necessary.  We then spent time answering her questions which she indicated were answered to her satisfaction.    Ana oDnato DO  8/21/2024 09:31 EDT

## 2024-08-21 ENCOUNTER — ROUTINE PRENATAL (OUTPATIENT)
Dept: OBSTETRICS AND GYNECOLOGY | Facility: CLINIC | Age: 24
End: 2024-08-21
Payer: COMMERCIAL

## 2024-08-21 VITALS — WEIGHT: 163 LBS | DIASTOLIC BLOOD PRESSURE: 75 MMHG | SYSTOLIC BLOOD PRESSURE: 118 MMHG | BODY MASS INDEX: 27.12 KG/M2

## 2024-08-21 DIAGNOSIS — O26.899 RH NEGATIVE, ANTEPARTUM: ICD-10-CM

## 2024-08-21 DIAGNOSIS — Z34.80 SUPERVISION OF OTHER NORMAL PREGNANCY, ANTEPARTUM: Primary | ICD-10-CM

## 2024-08-21 DIAGNOSIS — Z67.91 RH NEGATIVE, ANTEPARTUM: ICD-10-CM

## 2024-08-21 LAB
GLUCOSE UR STRIP-MCNC: NEGATIVE MG/DL
PROT UR STRIP-MCNC: NEGATIVE MG/DL

## 2024-08-21 NOTE — ASSESSMENT & PLAN NOTE
EDMUND finalized: 10/20/24 per 8-week US and ACOG    Optional testing NIPS,CF/SMA,AFP:  not done    COVID: Fully vaccinated  Flu: recommended  Tdap: s/p vaccine  RSV:    28-32 weeks repeat TPA: neg  ? Desires Sterilization:    Anatomy US: 6/7/2024 EFW (45) AC (45) Br PP, XY normal anatomical study.  Follow-up as clinically indicated.  FU US: ordered    PROBLEM LIST/PLAN:   S/p rhogam 8/6

## 2024-08-30 ENCOUNTER — APPOINTMENT (OUTPATIENT)
Dept: GENERAL RADIOLOGY | Facility: HOSPITAL | Age: 24
End: 2024-08-30
Payer: COMMERCIAL

## 2024-08-30 ENCOUNTER — HOSPITAL ENCOUNTER (EMERGENCY)
Facility: HOSPITAL | Age: 24
Discharge: HOME | End: 2024-08-30
Attending: EMERGENCY MEDICINE
Payer: COMMERCIAL

## 2024-08-30 VITALS
OXYGEN SATURATION: 98 % | HEART RATE: 88 BPM | DIASTOLIC BLOOD PRESSURE: 70 MMHG | BODY MASS INDEX: 28.04 KG/M2 | TEMPERATURE: 98 F | RESPIRATION RATE: 16 BRPM | WEIGHT: 164.24 LBS | HEIGHT: 64 IN | SYSTOLIC BLOOD PRESSURE: 118 MMHG

## 2024-08-30 DIAGNOSIS — R06.00 DYSPNEA, UNSPECIFIED TYPE: Primary | ICD-10-CM

## 2024-08-30 LAB
ALBUMIN SERPL-MCNC: 3.5 G/DL (ref 3.5–5.2)
ALBUMIN/GLOB SERPL: 1.1 G/DL
ALP SERPL-CCNC: 158 U/L (ref 39–117)
ALT SERPL W P-5'-P-CCNC: 13 U/L (ref 1–33)
ANION GAP SERPL CALCULATED.3IONS-SCNC: 13.2 MMOL/L (ref 5–15)
AST SERPL-CCNC: 21 U/L (ref 1–32)
BASOPHILS # BLD AUTO: 0.03 10*3/MM3 (ref 0–0.2)
BASOPHILS NFR BLD AUTO: 0.3 % (ref 0–1.5)
BILIRUB SERPL-MCNC: <0.2 MG/DL (ref 0–1.2)
BUN SERPL-MCNC: 5 MG/DL (ref 6–20)
BUN/CREAT SERPL: 9.3 (ref 7–25)
CALCIUM SPEC-SCNC: 9.4 MG/DL (ref 8.6–10.5)
CHLORIDE SERPL-SCNC: 103 MMOL/L (ref 98–107)
CO2 SERPL-SCNC: 20.8 MMOL/L (ref 22–29)
CREAT SERPL-MCNC: 0.54 MG/DL (ref 0.57–1)
DEPRECATED RDW RBC AUTO: 39.8 FL (ref 37–54)
EGFRCR SERPLBLD CKD-EPI 2021: 131.2 ML/MIN/1.73
EOSINOPHIL # BLD AUTO: 0.15 10*3/MM3 (ref 0–0.4)
EOSINOPHIL NFR BLD AUTO: 1.4 % (ref 0.3–6.2)
ERYTHROCYTE [DISTWIDTH] IN BLOOD BY AUTOMATED COUNT: 13 % (ref 12.3–15.4)
FLUAV SUBTYP SPEC NAA+PROBE: NOT DETECTED
FLUBV RNA ISLT QL NAA+PROBE: NOT DETECTED
GLOBULIN UR ELPH-MCNC: 3.2 GM/DL
GLUCOSE SERPL-MCNC: 92 MG/DL (ref 65–99)
HCT VFR BLD AUTO: 36.1 % (ref 34–46.6)
HGB BLD-MCNC: 11.9 G/DL (ref 12–15.9)
HOLD SPECIMEN: NORMAL
HOLD SPECIMEN: NORMAL
IMM GRANULOCYTES # BLD AUTO: 0.03 10*3/MM3 (ref 0–0.05)
IMM GRANULOCYTES NFR BLD AUTO: 0.3 % (ref 0–0.5)
LYMPHOCYTES # BLD AUTO: 2.53 10*3/MM3 (ref 0.7–3.1)
LYMPHOCYTES NFR BLD AUTO: 23.8 % (ref 19.6–45.3)
MCH RBC QN AUTO: 28.1 PG (ref 26.6–33)
MCHC RBC AUTO-ENTMCNC: 33 G/DL (ref 31.5–35.7)
MCV RBC AUTO: 85.1 FL (ref 79–97)
MONOCYTES # BLD AUTO: 0.73 10*3/MM3 (ref 0.1–0.9)
MONOCYTES NFR BLD AUTO: 6.9 % (ref 5–12)
NEUTROPHILS NFR BLD AUTO: 67.3 % (ref 42.7–76)
NEUTROPHILS NFR BLD AUTO: 7.14 10*3/MM3 (ref 1.7–7)
NRBC BLD AUTO-RTO: 0 /100 WBC (ref 0–0.2)
NT-PROBNP SERPL-MCNC: <36 PG/ML (ref 0–450)
PLATELET # BLD AUTO: 212 10*3/MM3 (ref 140–450)
PMV BLD AUTO: 11.9 FL (ref 6–12)
POTASSIUM SERPL-SCNC: 3.4 MMOL/L (ref 3.5–5.2)
PROT SERPL-MCNC: 6.7 G/DL (ref 6–8.5)
QT INTERVAL: 378 MS
QTC INTERVAL: 392 MS
RBC # BLD AUTO: 4.24 10*6/MM3 (ref 3.77–5.28)
RSV RNA NPH QL NAA+NON-PROBE: NOT DETECTED
SARS-COV-2 RNA RESP QL NAA+PROBE: NOT DETECTED
SODIUM SERPL-SCNC: 137 MMOL/L (ref 136–145)
TROPONIN T SERPL HS-MCNC: 16 NG/L
WBC NRBC COR # BLD AUTO: 10.61 10*3/MM3 (ref 3.4–10.8)
WHOLE BLOOD HOLD COAG: NORMAL
WHOLE BLOOD HOLD SPECIMEN: NORMAL

## 2024-08-30 PROCEDURE — 93005 ELECTROCARDIOGRAM TRACING: CPT

## 2024-08-30 PROCEDURE — 93005 ELECTROCARDIOGRAM TRACING: CPT | Performed by: EMERGENCY MEDICINE

## 2024-08-30 PROCEDURE — 83880 ASSAY OF NATRIURETIC PEPTIDE: CPT

## 2024-08-30 PROCEDURE — 85025 COMPLETE CBC W/AUTO DIFF WBC: CPT

## 2024-08-30 PROCEDURE — 36415 COLL VENOUS BLD VENIPUNCTURE: CPT

## 2024-08-30 PROCEDURE — 80053 COMPREHEN METABOLIC PANEL: CPT

## 2024-08-30 PROCEDURE — 84484 ASSAY OF TROPONIN QUANT: CPT

## 2024-08-30 PROCEDURE — 99284 EMERGENCY DEPT VISIT MOD MDM: CPT

## 2024-08-30 PROCEDURE — 87637 SARSCOV2&INF A&B&RSV AMP PRB: CPT

## 2024-08-30 RX ORDER — SODIUM CHLORIDE 0.9 % (FLUSH) 0.9 %
10 SYRINGE (ML) INJECTION AS NEEDED
Status: DISCONTINUED | OUTPATIENT
Start: 2024-08-30 | End: 2024-08-30 | Stop reason: HOSPADM

## 2024-08-30 NOTE — PROGRESS NOTES
Routine Prenatal Visit     Subjective  April Santos is a 24 y.o.  at 33w3d here for her routine OB visit.   She is taking her prenatal vitamins.Reports no loss of fluid or vaginal bleeding. Patient doing well without any complaints. Pregnancy complicated by:     Patient Active Problem List   Diagnosis    Supervision of other normal pregnancy, antepartum    Rh negative, antepartum         OB History    Para Term  AB Living   3 1 1 0 1 1   SAB IAB Ectopic Molar Multiple Live Births   1 0 0 0 0 1      # Outcome Date GA Lbr Randal/2nd Weight Sex Type Anes PTL Lv   3 Current            2 SAB  4w0d    SAB      1 Term 2018 40w0d  2892 g (6 lb 6 oz) M Vag-Spont   VIDAL           ROS:   General ROS: negative for - chills or fatigue  Genito-Urinary ROS: negative for  change in urinary stream, vaginal discharge     Objective  Physical Exam:   Vitals:    24 0919   BP: 108/76       Uterine Size: size equals dates  FHT: 110-160 BPM    General appearance - alert, well appearing, and in no distress  Abdomen- Soft, Gravid uterus, non-tender to palpation  Extremeties: negative swelling       Assessment/Plan:   Diagnoses and all orders for this visit:    1. Supervision of other normal pregnancy, antepartum (Primary)  Assessment & Plan:  EDMUND finalized: 10/20/24 per 8-week US and ACOG     Optional testing NIPS,CF/SMA,AFP:  not done     COVID: Fully vaccinated  Flu: recommended  Tdap: s/p vaccine  RSV:     28-32 weeks repeat TPA: neg  ? Desires Sterilization:     Anatomy US: 2024 EFW (45) AC (45) Br PP, XY normal anatomical study.  Follow-up as clinically indicated.  FU US: ordered     PROBLEM LIST/PLAN:   S/p rhogam            Orders:  -     POC Urinalysis Dipstick    2. Rh negative, antepartum            Counseling:   OB precautions, leaking, VB, fab delatorre vs PTL/Labor  FKC  Round Ligament Pain:  The uterus has several ligaments which provide support and keep the uterus in place. As the   uterus grows these ligaments are pulled and stretched which often causes sharp stabbing like pain in the inguinal area.   You may find a pregnancy support band helpful. Changing positions may also help. Yoga is a great way to cope with round ligament and low back pain in pregnancy.    Massage may also help with low back pain   Things to Consider at this Point in your Pregnancy:  Some women experience swelling in their feet during pregnancy. Compression stockings may help  Drink plenty of water and stay active   Make sure you are eating frequent small meals, nuts are a wonderful snack to keep with you            Return in about 2 weeks (around 9/18/2024) for Routine OB visit.      We have gone over prenatal care to include the timing and content of visits. I informed her how to contact the office and/or on call person in the event of any problems and encouraged her to do so when she feels it is necessary.  We then spent time answering her questions which she indicated were answered to her satisfaction.    Ana Donato,   9/4/2024 09:41 EDT

## 2024-08-30 NOTE — ED PROVIDER NOTES
Time: 2:20 PM EDT  Date of encounter:  8/30/2024  Independent Historian/Clinical History and Information was obtained by:   Patient    History is limited by: N/A    Chief Complaint   Patient presents with    Shortness of Breath     Patient states she is real shaky and her balance is off, also some shortness of breath. Patient states this has been going on for two days. Patient is c/o of pelvic pain.          History of Present Illness:  Patient is a 24 y.o. year old female who presents to the emergency department for evaluation of dyspnea.  Patient states she been having dyspnea yesterday.  Patient denies cough and fever.  Patient denies chest pain.  Patient is currently 32 weeks gestation.  Patient denies abdominal pain or vaginal bleeding.  Patient denies any history of DVT or PE.  Patient states her OB is Dr. Donato.  Patient is G2, P1.    Patient Care Team  Primary Care Provider: Provider, No Known    Past Medical History:     Allergies   Allergen Reactions    Penicillins Rash     Past Medical History:   Diagnosis Date    Asymptomatic bacteriuria during pregnancy 04/08/2024    Migraine     Urinary tract infection in mother during pregnancy, antepartum 04/08/2024     History reviewed. No pertinent surgical history.  Family History   Problem Relation Age of Onset    Cancer Father     Ovarian cancer Paternal Grandmother     Uterine cancer Paternal Grandmother     Breast cancer Paternal Grandmother 50    Uterine cancer Maternal Grandmother     Prostate cancer Neg Hx     Colon cancer Neg Hx     Alcohol abuse Neg Hx     Arthritis Neg Hx     Asthma Neg Hx     Birth defects Neg Hx     Bleeding Disorder Neg Hx        Home Medications:  Prior to Admission medications    Medication Sig Start Date End Date Taking? Authorizing Provider   Prenatal MV-Min-FA-Omega-3 (Prenatal Gummies/DHA & FA) 0.4-32.5 MG chewable tablet Chew 1 tablet Daily. 5/8/24   Shoaib Moreira MD        Social History:   Social History     Tobacco Use     "Smoking status: Never    Smokeless tobacco: Never   Vaping Use    Vaping status: Never Used   Substance Use Topics    Alcohol use: Not Currently     Comment: Ocassional    Drug use: Never         Review of Systems:  Review of Systems   Constitutional:  Negative for chills and fever.   HENT:  Negative for congestion, rhinorrhea and sore throat.    Eyes:  Negative for pain and visual disturbance.   Respiratory:  Positive for shortness of breath. Negative for apnea, cough and chest tightness.    Cardiovascular:  Negative for chest pain and palpitations.   Gastrointestinal:  Negative for abdominal pain, diarrhea, nausea and vomiting.   Genitourinary:  Negative for difficulty urinating and dysuria.   Musculoskeletal:  Negative for joint swelling and myalgias.   Skin:  Negative for color change.   Neurological:  Negative for seizures and headaches.   Psychiatric/Behavioral: Negative.     All other systems reviewed and are negative.       Physical Exam:  /80   Pulse 77   Temp 97.6 °F (36.4 °C) (Oral)   Resp 16   Ht 162.6 cm (64\")   Wt 74.5 kg (164 lb 3.9 oz)   LMP 01/10/2024 (Approximate)   SpO2 98%   BMI 28.19 kg/m²         Physical Exam  Vitals and nursing note reviewed.   Constitutional:       General: She is not in acute distress.     Appearance: Normal appearance. She is not toxic-appearing.   HENT:      Head: Normocephalic and atraumatic.      Jaw: There is normal jaw occlusion.      Mouth/Throat:      Mouth: Mucous membranes are moist.   Eyes:      General: Lids are normal.      Extraocular Movements: Extraocular movements intact.      Conjunctiva/sclera: Conjunctivae normal.      Pupils: Pupils are equal, round, and reactive to light.   Cardiovascular:      Rate and Rhythm: Normal rate and regular rhythm.      Pulses: Normal pulses.      Heart sounds: Normal heart sounds.   Pulmonary:      Effort: Pulmonary effort is normal. No respiratory distress.      Breath sounds: Normal breath sounds. No " wheezing or rhonchi.   Abdominal:      General: Abdomen is flat. There is no distension.      Palpations: Abdomen is soft.      Tenderness: There is no abdominal tenderness. There is no guarding or rebound.   Musculoskeletal:         General: Normal range of motion.      Cervical back: Normal range of motion and neck supple.      Right lower leg: No edema.      Left lower leg: No edema.   Skin:     General: Skin is warm and dry.   Neurological:      General: No focal deficit present.      Mental Status: She is alert and oriented to person, place, and time. Mental status is at baseline.   Psychiatric:         Mood and Affect: Mood normal.         Behavior: Behavior normal.                      Procedures:  Procedures      Medical Decision Making:      Comorbidities that affect care:    Pregnancy    External Notes reviewed:          The following orders were placed and all results were independently analyzed by me:  Orders Placed This Encounter   Procedures    COVID-19, FLU A/B, RSV PCR 1 HR TAT - Swab, Nasopharynx    [place] Draw    Comprehensive Metabolic Panel    BNP    Single High Sensitivity Troponin T    CBC Auto Differential    NPO Diet NPO Type: Strict NPO    Undress & Gown    Continuous Pulse Oximetry    Vital Signs    Oxygen Therapy- Nasal Cannula; Titrate 1-6 LPM Per SpO2; 90 - 95%    ECG 12 Lead ED Triage Standing Order; SOA    Insert Peripheral IV    CBC & Differential    Green Top (Gel)    Lavender Top    Gold Top - SST    Light Blue Top       Medications Given in the Emergency Department:  Medications   sodium chloride 0.9 % flush 10 mL (has no administration in time range)        ED Course:    The patient was initially evaluated in the triage area where orders were placed. The patient was later dispositioned by Luciano Bran PA-C.      The patient was advised to stay for completion of workup which includes but is not limited to communication of labs and radiological results, reassessment and plan. The  patient was advised that leaving prior to disposition by a provider could result in critical findings that are not communicated to the patient.     ED Course as of 08/30/24 1630   Fri Aug 30, 2024   1421 PROVIDER IN TRIAGE  Patient was evaluated by me in triage, Barry Tipton PA-C.  Orders were placed and patient is currently awaiting final results and disposition.  [MD]      ED Course User Index  [MD] Barry Tipton PA-C       Labs:    Lab Results (last 24 hours)       Procedure Component Value Units Date/Time    CBC & Differential [[id number]]  (Abnormal) Collected: 08/30/24 1434    Specimen: Blood from Arm, Right Updated: 08/30/24 1449    Narrative:      The following orders were created for panel order CBC & Differential.  Procedure                               Abnormality         Status                     ---------                               -----------         ------                     CBC Auto Differential[[id number]]        Abnormal            Final result                 Please view results for these tests on the individual orders.    Comprehensive Metabolic Panel [[id number]]  (Abnormal) Collected: 08/30/24 1434    Specimen: Blood Updated: 08/30/24 1511     Glucose 92 mg/dL      BUN 5 mg/dL      Creatinine 0.54 mg/dL      Sodium 137 mmol/L      Potassium 3.4 mmol/L      Chloride 103 mmol/L      CO2 20.8 mmol/L      Calcium 9.4 mg/dL      Total Protein 6.7 g/dL      Albumin 3.5 g/dL      ALT (SGPT) 13 U/L      AST (SGOT) 21 U/L      Alkaline Phosphatase 158 U/L      Total Bilirubin <0.2 mg/dL      Globulin 3.2 gm/dL      A/G Ratio 1.1 g/dL      BUN/Creatinine Ratio 9.3     Anion Gap 13.2 mmol/L      eGFR 131.2 mL/min/1.73     Narrative:      GFR Normal >60  Chronic Kidney Disease <60  Kidney Failure <15      BNP [[id number]]  (Normal) Collected: 08/30/24 1434    Specimen: Blood Updated: 08/30/24 1507     proBNP <36.0 pg/mL     Narrative:      This assay is used as an aid in the diagnosis of  individuals suspected of having heart failure. It can be used as an aid in the diagnosis of acute decompensated heart failure (ADHF) in patients presenting with signs and symptoms of ADHF to the emergency department (ED). In addition, NT-proBNP of <300 pg/mL indicates ADHF is not likely.    Age Range Result Interpretation  NT-proBNP Concentration (pg/mL:      <50             Positive            >450                   Gray                 300-450                    Negative             <300    50-75           Positive            >900                  Gray                300-900                  Negative            <300      >75             Positive            >1800                  Gray                300-1800                  Negative            <300    Single High Sensitivity Troponin T [[id number]]  (Abnormal) Collected: 08/30/24 1434    Specimen: Blood Updated: 08/30/24 1511     HS Troponin T 16 ng/L     Narrative:      High Sensitive Troponin T Reference Range:  <14.0 ng/L- Negative Female for AMI  <22.0 ng/L- Negative Male for AMI  >=14 - Abnormal Female indicating possible myocardial injury.  >=22 - Abnormal Male indicating possible myocardial injury.   Clinicians would have to utilize clinical acumen, EKG, Troponin, and serial changes to determine if it is an Acute Myocardial Infarction or myocardial injury due to an underlying chronic condition.         CBC Auto Differential [[id number]]  (Abnormal) Collected: 08/30/24 1434    Specimen: Blood from Arm, Right Updated: 08/30/24 1449     WBC 10.61 10*3/mm3      RBC 4.24 10*6/mm3      Hemoglobin 11.9 g/dL      Hematocrit 36.1 %      MCV 85.1 fL      MCH 28.1 pg      MCHC 33.0 g/dL      RDW 13.0 %      RDW-SD 39.8 fl      MPV 11.9 fL      Platelets 212 10*3/mm3      Neutrophil % 67.3 %      Lymphocyte % 23.8 %      Monocyte % 6.9 %      Eosinophil % 1.4 %      Basophil % 0.3 %      Immature Grans % 0.3 %      Neutrophils, Absolute 7.14 10*3/mm3      Lymphocytes,  Absolute 2.53 10*3/mm3      Monocytes, Absolute 0.73 10*3/mm3      Eosinophils, Absolute 0.15 10*3/mm3      Basophils, Absolute 0.03 10*3/mm3      Immature Grans, Absolute 0.03 10*3/mm3      nRBC 0.0 /100 WBC     COVID-19, FLU A/B, RSV PCR 1 HR TAT - Swab, Nasopharynx [[id number]]  (Normal) Collected: 08/30/24 1504    Specimen: Swab from Nasopharynx Updated: 08/30/24 1545     COVID19 Not Detected     Influenza A PCR Not Detected     Influenza B PCR Not Detected     RSV, PCR Not Detected    Narrative:      Fact sheet for providers: https://www.tradeNOW.gov/media/673501/download    Fact sheet for patients: https://www.tradeNOW.gov/media/520287/download    Test performed by PCR.             Imaging:    No Radiology Exams Resulted Within Past 24 Hours      Differential Diagnosis and Discussion:      Dyspnea: Differential diagnosis includes but is not limited to metabolic acidosis, neurological disorders, psychogenic, asthma, pneumothorax, upper airway obstruction, COPD, pneumonia, noncardiogenic pulmonary edema, interstitial lung disease, anemia, congestive heart failure, and pulmonary embolism    All labs were reviewed and interpreted by me.  EKG was interpreted by supervising attending.    MDM     Patient's lungs are clear to auscultation bilaterally.  Patient is resting comfortably.  Patient's oxygen saturation is 98% on room air.  Patient is not tachycardic.  Patient is afebrile.  CBC shows no evidence of leukocytosis.  COVID, influenza, RSV negative.  BMP within normal limits.  Patient denies chest pain.  Patient denies chest x-ray.  Instructed patient to return to ED if she develops any new or worsening symptoms.  Patient states she wishes to go home at this time.  Instructed patient to follow-up with OB in the meantime.  Patient states she understands and agrees with plan of care.          Patient Care Considerations:          Consultants/Shared Management Plan:    None    Social Determinants of Health:    Patient is  independent, reliable, and has access to care.       Disposition and Care Coordination:    Discharged: The patient is suitable and stable for discharge with no need for consideration of admission.    I have explained the patient´s condition, diagnoses and treatment plan based on the information available to me at this time. I have answered questions and addressed any concerns. The patient has a good  understanding of the patient´s diagnosis, condition, and treatment plan as can be expected at this point. The vital signs have been stable. The patient´s condition is stable and appropriate for discharge from the emergency department.      The patient will pursue further outpatient evaluation with the primary care physician or other designated or consulting physician as outlined in the discharge instructions. They are agreeable to this plan of care and follow-up instructions have been explained in detail. The patient has received these instructions in written format and has expressed an understanding of the discharge instructions. The patient is aware that any significant change in condition or worsening of symptoms should prompt an immediate return to this or the closest emergency department or call to 911.  I have explained discharge medications and the need for follow up with the patient/caretakers. This was also printed in the discharge instructions. Patient was discharged with the following medications and follow up:      Medication List      No changes were made to your prescriptions during this visit.      Provider, No Known  [facility]  [place] [id number]    Call in 1 day  To schedule follow-up       Final diagnoses:   Dyspnea, unspecified type        ED Disposition       ED Disposition   Discharge    Condition   Stable    Comment   --               This medical record created using voice recognition software.             Luciano Bran PA-C  08/30/24 9521

## 2024-09-04 ENCOUNTER — ROUTINE PRENATAL (OUTPATIENT)
Dept: OBSTETRICS AND GYNECOLOGY | Facility: CLINIC | Age: 24
End: 2024-09-04
Payer: COMMERCIAL

## 2024-09-04 VITALS — DIASTOLIC BLOOD PRESSURE: 76 MMHG | SYSTOLIC BLOOD PRESSURE: 108 MMHG | BODY MASS INDEX: 27.98 KG/M2 | WEIGHT: 163 LBS

## 2024-09-04 DIAGNOSIS — Z67.91 RH NEGATIVE, ANTEPARTUM: ICD-10-CM

## 2024-09-04 DIAGNOSIS — O26.899 RH NEGATIVE, ANTEPARTUM: ICD-10-CM

## 2024-09-04 DIAGNOSIS — Z34.80 SUPERVISION OF OTHER NORMAL PREGNANCY, ANTEPARTUM: Primary | ICD-10-CM

## 2024-09-04 LAB
GLUCOSE UR STRIP-MCNC: NEGATIVE MG/DL
PROT UR STRIP-MCNC: NEGATIVE MG/DL

## 2024-09-07 LAB
QT INTERVAL: 378 MS
QTC INTERVAL: 392 MS

## 2024-09-18 ENCOUNTER — ROUTINE PRENATAL (OUTPATIENT)
Dept: OBSTETRICS AND GYNECOLOGY | Facility: CLINIC | Age: 24
End: 2024-09-18
Payer: COMMERCIAL

## 2024-09-18 VITALS — WEIGHT: 165 LBS | DIASTOLIC BLOOD PRESSURE: 83 MMHG | SYSTOLIC BLOOD PRESSURE: 119 MMHG | BODY MASS INDEX: 28.32 KG/M2

## 2024-09-18 DIAGNOSIS — O26.899 RH NEGATIVE, ANTEPARTUM: ICD-10-CM

## 2024-09-18 DIAGNOSIS — Z34.80 SUPERVISION OF OTHER NORMAL PREGNANCY, ANTEPARTUM: Primary | ICD-10-CM

## 2024-09-18 DIAGNOSIS — Z67.91 RH NEGATIVE, ANTEPARTUM: ICD-10-CM

## 2024-09-18 LAB
GLUCOSE UR STRIP-MCNC: NEGATIVE MG/DL
PROT UR STRIP-MCNC: NEGATIVE MG/DL

## 2024-09-26 ENCOUNTER — ROUTINE PRENATAL (OUTPATIENT)
Dept: OBSTETRICS AND GYNECOLOGY | Facility: CLINIC | Age: 24
End: 2024-09-26
Payer: COMMERCIAL

## 2024-09-26 VITALS — BODY MASS INDEX: 29.01 KG/M2 | SYSTOLIC BLOOD PRESSURE: 125 MMHG | DIASTOLIC BLOOD PRESSURE: 79 MMHG | WEIGHT: 169 LBS

## 2024-09-26 DIAGNOSIS — O26.899 RH NEGATIVE, ANTEPARTUM: ICD-10-CM

## 2024-09-26 DIAGNOSIS — Z34.80 SUPERVISION OF OTHER NORMAL PREGNANCY, ANTEPARTUM: Primary | ICD-10-CM

## 2024-09-26 DIAGNOSIS — Z67.91 RH NEGATIVE, ANTEPARTUM: ICD-10-CM

## 2024-09-26 LAB
GLUCOSE UR STRIP-MCNC: NEGATIVE MG/DL
PROT UR STRIP-MCNC: NEGATIVE MG/DL

## 2024-09-26 PROCEDURE — 87653 STREP B DNA AMP PROBE: CPT | Performed by: STUDENT IN AN ORGANIZED HEALTH CARE EDUCATION/TRAINING PROGRAM

## 2024-09-27 LAB — GROUP B STREP, DNA: NEGATIVE

## 2024-09-30 NOTE — PROGRESS NOTES
Routine Prenatal Visit     Subjective  April Santos is a 24 y.o.  at 37w4d here for her routine OB visit.   She is taking her prenatal vitamins.Reports no loss of fluid or vaginal bleeding. Patient doing well.     Pregnancy complicated by:     Patient Active Problem List   Diagnosis    Supervision of other normal pregnancy, antepartum    Rh negative, antepartum         OB History    Para Term  AB Living   3 1 1 0 1 1   SAB IAB Ectopic Molar Multiple Live Births   1 0 0 0 0 1      # Outcome Date GA Lbr Randal/2nd Weight Sex Type Anes PTL Lv   3 Current            2 SAB  4w0d    SAB      1 Term 2018 40w0d  2892 g (6 lb 6 oz) M Vag-Spont   VIDAL           ROS:  General ROS: negative for - chills or fatigue  Genito-Urinary ROS: negative for  change in urinary stream, vaginal discharge     Objective  Physical Exam:   Vitals:    10/03/24 1017   BP: 127/84       Uterine Size: size equals dates  FHT: 110-160 BPM    General appearance - alert, well appearing, and in no distress  Abdomen- Soft, Gravid uterus, non-tender to palpation  Extremeties: negative swelling   SVE 3/70/-2    Assessment/Plan:   Diagnoses and all orders for this visit:    1. Supervision of other normal pregnancy, antepartum (Primary)  Assessment & Plan:  EDMUND finalized: 10/20/24 per 8-week US and ACOG     Optional testing NIPS,CF/SMA,AFP:  not done     COVID: Fully vaccinated  Flu: recommended  Tdap: s/p vaccine  RSV: given 24        28-32 weeks repeat TPA: neg  ? Desires Sterilization:     Anatomy US: 2024 EFW (45) AC (45) Br PP, XY normal anatomical study.  Follow-up as clinically indicated.  FU US:  EFW 33%, AC 19%, cephalic, MILANA 8.62     PROBLEM LIST/PLAN:   S/p rhogam   Low-normal MILANA       Orders:  -     POC Urinalysis Dipstick    2. Rh negative, antepartum            Counseling:   OB precautions, leaking, VB, fab delatorre vs PTL/Labor  [facility]  Round Ligament Pain:  The uterus has several ligaments which  provide support and keep the uterus in place. As the  uterus grows these ligaments are pulled and stretched which often causes sharp stabbing like pain in the inguinal area.   You may find a pregnancy support band helpful. Changing positions may also help. Yoga is a great way to cope with round ligament and low back pain in pregnancy.    Massage may also help with low back pain   Things to Consider at this Point in your Pregnancy:  Some women experience swelling in their feet during pregnancy. Compression stockings may help  Drink plenty of water and stay active   Make sure you are eating frequent small meals, nuts are a wonderful snack to keep with you            Return in about 1 week (around 10/10/2024) for Routine OB visit.      We have gone over prenatal care to include the timing and content of visits. I informed her how to contact the office and/or on call person in the event of any problems and encouraged her to do so when she feels it is necessary.  We then spent time answering her questions which she indicated were answered to her satisfaction.    Ana Donato,   10/3/2024 10:50 EDT

## 2024-10-03 ENCOUNTER — ROUTINE PRENATAL (OUTPATIENT)
Dept: OBSTETRICS AND GYNECOLOGY | Facility: CLINIC | Age: 24
End: 2024-10-03
Payer: COMMERCIAL

## 2024-10-03 VITALS — WEIGHT: 166 LBS | SYSTOLIC BLOOD PRESSURE: 127 MMHG | DIASTOLIC BLOOD PRESSURE: 84 MMHG | BODY MASS INDEX: 28.49 KG/M2

## 2024-10-03 DIAGNOSIS — Z34.80 SUPERVISION OF OTHER NORMAL PREGNANCY, ANTEPARTUM: Primary | ICD-10-CM

## 2024-10-03 DIAGNOSIS — Z67.91 RH NEGATIVE, ANTEPARTUM: ICD-10-CM

## 2024-10-03 DIAGNOSIS — O26.899 RH NEGATIVE, ANTEPARTUM: ICD-10-CM

## 2024-10-03 LAB
GLUCOSE UR STRIP-MCNC: NEGATIVE MG/DL
PROT UR STRIP-MCNC: NEGATIVE MG/DL

## 2024-10-03 NOTE — ASSESSMENT & PLAN NOTE
EDMUND finalized: 10/20/24 per 8-week US and ACOG     Optional testing NIPS,CF/SMA,AFP:  not done     COVID: Fully vaccinated  Flu: recommended  Tdap: s/p vaccine  RSV: given 09/26/24        28-32 weeks repeat TPA: neg  ? Desires Sterilization:     Anatomy US: 6/7/2024 EFW (45) AC (45) Br PP, XY normal anatomical study.  Follow-up as clinically indicated.  FU US: 9/26 EFW 33%, AC 19%, cephalic, MILANA 8.62     PROBLEM LIST/PLAN:   S/p rhogam 8/6  Low-normal MILANA

## 2024-10-08 ENCOUNTER — HOSPITAL ENCOUNTER (OUTPATIENT)
Facility: HOSPITAL | Age: 24
Discharge: HOME | End: 2024-10-08
Attending: OBSTETRICS & GYNECOLOGY | Admitting: OBSTETRICS & GYNECOLOGY
Payer: COMMERCIAL

## 2024-10-08 LAB
BILIRUB BLD-MCNC: NEGATIVE MG/DL
CLARITY, POC: CLEAR
COLOR UR: YELLOW
GLUCOSE UR STRIP-MCNC: NEGATIVE MG/DL
KETONES UR QL: NEGATIVE
LEUKOCYTE EST, POC: NEGATIVE
NITRITE UR-MCNC: NEGATIVE MG/ML
PH UR: 6.5 [PH] (ref 5–8)
PROT UR STRIP-MCNC: NEGATIVE MG/DL
RBC # UR STRIP: NEGATIVE /UL
SP GR UR: 1.01 (ref 1–1.03)
UROBILINOGEN UR QL: NORMAL

## 2024-10-08 PROCEDURE — G0463 HOSPITAL OUTPT CLINIC VISIT: HCPCS

## 2024-10-08 PROCEDURE — 59025 FETAL NON-STRESS TEST: CPT

## 2024-10-08 PROCEDURE — 81002 URINALYSIS NONAUTO W/O SCOPE: CPT | Performed by: OBSTETRICS & GYNECOLOGY

## 2024-10-08 NOTE — PROGRESS NOTES
Routine Prenatal Visit     Subjective  April Santos is a 24 y.o.  at 38w4d here for her routine OB visit.   She is taking her prenatal vitamins.Reports no loss of fluid or vaginal bleeding. Patient doing well.     Pregnancy complicated by:     Patient Active Problem List   Diagnosis    Supervision of other normal pregnancy, antepartum    Rh negative, antepartum         OB History    Para Term  AB Living   3 1 1 0 1 1   SAB IAB Ectopic Molar Multiple Live Births   1 0 0 0 0 1      # Outcome Date GA Lbr Randal/2nd Weight Sex Type Anes PTL Lv   3 Current            2 SAB  4w0d    SAB      1 Term 2018 40w0d  2892 g (6 lb 6 oz) M Vag-Spont   VIDAL           ROS:  General ROS: negative for - chills or fatigue  Genito-Urinary ROS: negative for  change in urinary stream, vaginal discharge     Objective  Physical Exam:   Vitals:    10/10/24 1135   BP: 116/82       Uterine Size: size equals dates  FHT: 110-160 BPM    General appearance - alert, well appearing, and in no distress  Abdomen- Soft, Gravid uterus, non-tender to palpation  Extremeties: negative swelling   Membrane stripping performed at pt request.  Discussed potential risks/benefits.  SVE 3/70/-2    Assessment/Plan:   Diagnoses and all orders for this visit:    1. Supervision of other normal pregnancy, antepartum (Primary)  -     POC Urinalysis Dipstick    2. Rh negative, antepartum            Counseling:   OB precautions, leaking, VB, fab delatorre vs PTL/Labor  [facility]  Round Ligament Pain:  The uterus has several ligaments which provide support and keep the uterus in place. As the  uterus grows these ligaments are pulled and stretched which often causes sharp stabbing like pain in the inguinal area.   You may find a pregnancy support band helpful. Changing positions may also help. Yoga is a great way to cope with round ligament and low back pain in pregnancy.    Massage may also help with low back pain   Things to Consider at this Point  in your Pregnancy:  Some women experience swelling in their feet during pregnancy. Compression stockings may help  Drink plenty of water and stay active   Make sure you are eating frequent small meals, nuts are a wonderful snack to keep with you            Return in about 1 week (around 10/17/2024) for Routine OB visit.      We have gone over prenatal care to include the timing and content of visits. I informed her how to contact the office and/or on call person in the event of any problems and encouraged her to do so when she feels it is necessary.  We then spent time answering her questions which she indicated were answered to her satisfaction.    Ana Donato DO  10/10/2024 12:40 EDT

## 2024-10-09 NOTE — NURSING NOTE
,38 2/7 weeks, presented with complaints of lower back pain, lower abd pain, pelvic pressure. Denies leaking or bleeding, states fetal movement positive. Admitted to triage 2 for evaluation and monitoring. External monitors applied. Abdomen soft, non-tender to palpation. SVE 3/60/-3, VS WNL. Urine results negative.  Dr Sotelo notified of the above and will come down and evaluate.

## 2024-10-09 NOTE — NON STRESS TEST
Obstetrical Non-stress Test Interpretation     Name:  April Santos  MRN: [id number]    24 y.o. female  at 38w2d    Indication: back pain, abd pain      Fetal Assessment  Fetal Movement: active  Fetal HR Assessment Method: external  Fetal HR (beats/min): 150  Fetal HR Baseline: normal range  Fetal HR Variability: moderate (amplitude range 6 to 25 bpm)  Fetal HR Accelerations: episodic, greater than/equal to 15 bpm, lasting at least 15 seconds  Fetal HR Decelerations: absent  Sinusoidal Pattern Present: absent    LMP 01/10/2024 (Approximate)     Reason for test:    Date of Test: 10/8/2024  Time frame of test: 50 minutes  RN NST Interpretation: Reactive      Zenia Esparza RN  10/8/2024  20:46 EDT

## 2024-10-09 NOTE — OBED NOTES
SÁNCHEZ Figueroa  Obstetric History and Physical    Chief Complaint   Patient presents with    Back Pain    Abdominal Pain       Subjective     Patient is a 24 y.o. female  currently at 38w2d, who presents with complaint of lower back pain and lower abdominal pain.  She denies any vaginal bleeding or loss of fluid.  Positive fetal movements.    PNC provided by:  [facility] [place] [facility]. Her prenatal care is benign.  Her previous obstetric/gynecological history is noted for is non-contributory.    The following portions of the patients history were reviewed and updated as appropriate: current medications, allergies, past medical history, past surgical history, past family history, past social history, and problem list .       Prenatal Information:  Prenatal Results       Initial Prenatal Labs       Test Value Reference Range Date Time    Hemoglobin  12.6 g/dL 12.0 - 15.9 24 1456    Hematocrit  38.0 % 34.0 - 46.6 24 1456    Platelets  301 10*3/mm3 140 - 450 24 1456    Rubella IgG  1.13 index Immune >0.99 24 1456    Hepatitis B SAg  Non-Reactive  Non-Reactive 24 1456    Hepatitis C Ab  Non-Reactive  Non-Reactive 24 1456    RPR        T. Pallidum Ab   Non-Reactive  Non-Reactive 24 1130       Non-Reactive  Non-Reactive 24 1456    ABO  O   24 1130    Rh  Negative   24 1130    Antibody Screen  Negative   24 1456    HIV  Non-Reactive  Non-Reactive 24 1456    Urine Culture  No growth   24 1028       >100,000 CFU/mL Klebsiella pneumoniae ssp pneumoniae   24 1456    Gonorrhea  Negative  Negative 24 1456    Chlamydia  Negative  Negative 24 1456    TSH        HgB A1c         Varicella IgG        Hemoglobinopathy Fractionation  Comment   24 1456    Hemoglobinopathy (genetic testing)        Cystic fibrosis                   Fetal testing        Test Value Reference Range Date Time    NIPT        MSAFP        AFP-4                  2nd and 3rd  Trimester       Test Value Reference Range Date Time    Hemoglobin (repeated)  11.9 g/dL 12.0 - 15.9 08/30/24 1434       11.5 g/dL 12.0 - 15.9 07/05/24 1511    Hematocrit (repeated)  36.1 % 34.0 - 46.6 08/30/24 1434       35.4 % 34.0 - 46.6 07/05/24 1511    Platelets   212 10*3/mm3 140 - 450 08/30/24 1434       248 10*3/mm3 140 - 450 07/05/24 1511       301 10*3/mm3 140 - 450 04/04/24 1456    1 hour GTT   87 mg/dL 65 - 139 07/05/24 1511    Antibody Screen (repeated)  Negative   08/06/24 1130    3rd TM syphilis scrn (repeated)  RPR         3rd TM syphilis scrn (repeated) TP-Ab  Non-Reactive  Non-Reactive 08/06/24 1130    3rd TM syphilis screen TB-Ab (FTA)  Non-Reactive  Non-Reactive 08/06/24 1130    Syphilis cascade test TP-Ab (EIA)        Syphilis cascade TPPA        GTT Fasting        GTT 1 Hr        GTT 2 Hr        GTT 3 Hr        Group B Strep  Negative  Negative 09/26/24 1030              Other testing        Test Value Reference Range Date Time    Parvo IgG         CMV IgG                   Drug Screening       Test Value Reference Range Date Time    Amphetamine Screen        Barbiturate Screen  Negative  Negative 04/04/24 1456    Benzodiazepine Screen  Negative  Negative 04/04/24 1456    Methadone Screen  Negative  Negative 04/04/24 1456    Phencyclidine Screen        Opiates Screen  Negative  Negative 04/04/24 1456    THC Screen  Negative  Negative 04/04/24 1456    Cocaine Screen  Negative  Negative 04/04/24 1456    Propoxyphene Screen        Buprenorphine Screen        Methamphetamine Screen        Oxycodone Screen  Negative  Negative 04/04/24 1456    Tricyclic Antidepressants Screen                  Legend    ^: Historical                          External Prenatal Results       Pregnancy Outside Results - Transcribed From Office Records - See Scanned Records For Details       Test Value Date Time    ABO  O  08/06/24 1130    Rh  Negative  08/06/24 1130    Antibody Screen  Negative  08/06/24 1130        Negative  24 1456    Varicella IgG       Rubella  1.13 index 24 1456    Hgb  11.9 g/dL 24 1434       11.5 g/dL 24 1511       12.6 g/dL 24 1456    Hct  36.1 % 24 1434       35.4 % 24 1511       38.0 % 24 1456    HgB A1c        1h GTT  87 mg/dL 24 1511    3h GTT Fasting       3h GTT 1 hour       3h GTT 2 hour       3h GTT 3 hour        Gonorrhea (discrete)  Negative  24 1456    Chlamydia (discrete)  Negative  24 1456    RPR       Syphils cascade: TP-Ab (FTA)  Non-Reactive  24 1130       Non-Reactive  24 1456    TP-Ab  Non-Reactive  24 1130       Non-Reactive  24 1456    TP-Ab (EIA)       TPPA       HBsAg  Non-Reactive  24 1456    Herpes Simplex Virus PCR       Herpes Simplex VIrus Culture       HIV  Non-Reactive  24 1456    Hep C RNA Quant PCR       Hep C Antibody  Non-Reactive  24 1456    AFP       NIPT       Cystic Fibroisis        Group B Strep  Negative  24 1030    GBS Susceptibility to Clindamycin       GBS Susceptibility to Erythromycin       Fetal Fibronectin       Genetic Testing, Maternal Blood                 Drug Screening       Test Value Date Time    Urine Drug Screen       Amphetamine Screen       Barbiturate Screen  Negative  24 1456    Benzodiazepine Screen  Negative  24 1456    Methadone Screen  Negative  24 1456    Phencyclidine Screen       Opiates Screen  Negative  24 1456    THC Screen  Negative  24 1456    Cocaine Screen       Propoxyphene Screen       Buprenorphine Screen       Methamphetamine Screen       Oxycodone Screen  Negative  24 1456    Tricyclic Antidepressants Screen                 Legend    ^: Historical                             Past OB History:     OB History    Para Term  AB Living   3 1 1 0 1 1   SAB IAB Ectopic Molar Multiple Live Births   1 0 0 0 0 1      # Outcome Date GA Lbr Randal/2nd Weight Sex Type Anes PTL Lv    3 Current            2 SAB 2023 4w0d    SAB      1 Term 2018 40w0d  2892 g (6 lb 6 oz) M Vag-Spont   VIDAL       Past Medical History: Past Medical History:   Diagnosis Date    Asymptomatic bacteriuria during pregnancy 04/08/2024    Migraine     Urinary tract infection in mother during pregnancy, antepartum 04/08/2024      Past Surgical History History reviewed. No pertinent surgical history.   Family History: Family History   Problem Relation Age of Onset    Cancer Father     Ovarian cancer Paternal Grandmother     Uterine cancer Paternal Grandmother     Breast cancer Paternal Grandmother 50    Uterine cancer Maternal Grandmother     Prostate cancer Neg Hx     Colon cancer Neg Hx     Alcohol abuse Neg Hx     Arthritis Neg Hx     Asthma Neg Hx     Birth defects Neg Hx     Bleeding Disorder Neg Hx       Social History:  reports that she has never smoked. She has never used smokeless tobacco.   reports that she does not currently use alcohol.   reports no history of drug use.        General ROS: Pertinent items are noted in HPI    Objective       Vital Signs Range for the last 24 hours  Temperature:     Temp Source:     BP:     Pulse:     Respirations:     SPO2:     O2 Amount (l/min):     O2 Devices     Weight:       Physical Examination: General appearance - alert, well appearing, and in no distress  Mental status - alert, oriented to person, place, and time  Chest - clear to auscultation, no wheezes, rales or rhonchi, symmetric air entry  Heart - normal rate, regular rhythm, normal S1, S2, no murmurs, rubs, clicks or gallops  Abdomen - soft, nontender, gravid  Extremities - peripheral pulses normal, no pedal edema, no clubbing or cyanosis  Skin - normal coloration and turgor, no rashes, no suspicious skin lesions noted    Presentation: Vertex   Cervix: Exam by: Method: sterile exam per RN   Dilation: Cervical Dilation (cm): 3   Effacement: Cervical Effacement: 60%   Station:         Fetal Heart Rate Assessment    Method:     Beats/min:     Baseline:     Variability:     Accels:     Decels:           Uterine Assessment   Method:     Frequency (min):     Ctx Count in 10 min:     Duration:     Intensity:         [place] Units:       GBS is negative      Assessment & Plan     Lower abdominal pain      Assessment:  1.  Intrauterine pregnancy at 38w2d gestation with reactive fetal status.    2024 Negative Negative Final   2.  IUP at 38 weeks estimate gestational age with no evidence for  labor.    Plan:  Discharge home  Labor precautions  Strict fetal kick count      Jose Miguel Sotelo MD  10/8/2024  20:34 EDT

## 2024-10-10 ENCOUNTER — ROUTINE PRENATAL (OUTPATIENT)
Dept: OBSTETRICS AND GYNECOLOGY | Facility: CLINIC | Age: 24
End: 2024-10-10
Payer: COMMERCIAL

## 2024-10-10 VITALS — DIASTOLIC BLOOD PRESSURE: 82 MMHG | WEIGHT: 167 LBS | BODY MASS INDEX: 28.67 KG/M2 | SYSTOLIC BLOOD PRESSURE: 116 MMHG

## 2024-10-10 DIAGNOSIS — O26.899 RH NEGATIVE, ANTEPARTUM: ICD-10-CM

## 2024-10-10 DIAGNOSIS — Z67.91 RH NEGATIVE, ANTEPARTUM: ICD-10-CM

## 2024-10-10 DIAGNOSIS — Z34.80 SUPERVISION OF OTHER NORMAL PREGNANCY, ANTEPARTUM: Primary | ICD-10-CM

## 2024-10-10 LAB
GLUCOSE UR STRIP-MCNC: NEGATIVE MG/DL
PROT UR STRIP-MCNC: NEGATIVE MG/DL

## 2024-10-17 NOTE — PROGRESS NOTES
OB FOLLOW UP      Chief Complaint   Patient presents with    Routine Prenatal Visit     Declined Flu vaccine      Subjective:   New patient to me, usually sees Dr. Donato    Borderline low MILANA September 26th.  MILANA today wnl    No complaints    Objective:  /80   Wt 76.2 kg (168 lb)   LMP 01/10/2024 (Approximate)   BMI 28.84 kg/m²  8.165 kg (18 lb) Body mass index is 28.84 kg/m².    See OB flow sheet for fundal height (not performed if US day of OV), FHT, edema, cvx exam if performed, and Upro/Uglu.   Chaperone present during pelvic exam if performed.  Membrane stripping performed.  Discussed potential risks/benefits.    Assessment and Plan:  39w5d     Diagnoses and all orders for this visit:    1. Supervision of other normal pregnancy, antepartum (Primary)  Overview:  EDMUND finalized: 10/20/24 by 8-week US.  LMP unsure    Optional testing NIPS,CF/SMA,AFP: Not done    COVID: Recommended  Flu: Recommended  Tdap: Vaccinated  RSV: Vaccinated 2024    Rhogam: S/p rhogam 8/6  1hr Glucola: 81  FU RPR w glucola: Neg  ? Desires Sterilization:Declines 10/18/2024     Anatomy US: 6/7/24 EFW (45) AC (45) Br PP, XY normal anatomical study.  Follow-up as clinically indicated.  FU US: 9/26/24 EFW 33%, AC 19%, cephalic, MILANA 8.62  FU US: 10/18/2024 MILANA 14.2, VTX, gd 2 placenta, BPP 8/8    PROBLEM LIST/PLAN:   Borderline MILANA September- fu wnl    Orders:  -     POC Urinalysis Dipstick    2. MILANA (amniotic fluid index) borderline low  -     US Ob 14 + Weeks Single or First Gestation; Future    Other orders  -     Cancel: Fluzone >6mos      Counseling:    OB precautions, leaking, VB, fab delatorre vs PTL/Labor  [facility]  VACCINE importance in pregnancy discussed.  Maternal and fetal risk of not being vaccinated reviewed.  Questions answered.       Reassuring pregnancy progress. Questions answered.  Continue PNV.  Importance of healthy eating, obtaining sufficient sleep, and staying active unless hypertensive- activity modified as  directed.    Return in about 1 week (around 10/25/2024) for FU OB.            Mariela Brady, DO  10/18/2024    [facility]  [address] [place] [address]  Dept: [phone]  Dept Fax: [phone]  Loc: [phone]  Loc Fax: [phone]

## 2024-10-18 ENCOUNTER — APPOINTMENT (OUTPATIENT)
Dept: ULTRASOUND IMAGING | Facility: HOSPITAL | Age: 24
End: 2024-10-18
Payer: COMMERCIAL

## 2024-10-18 ENCOUNTER — ROUTINE PRENATAL (OUTPATIENT)
Dept: OBSTETRICS AND GYNECOLOGY | Facility: CLINIC | Age: 24
End: 2024-10-18
Payer: COMMERCIAL

## 2024-10-18 ENCOUNTER — HOSPITAL ENCOUNTER (OUTPATIENT)
Facility: HOSPITAL | Age: 24
Discharge: HOME | End: 2024-10-18
Attending: OBSTETRICS & GYNECOLOGY | Admitting: OBSTETRICS & GYNECOLOGY
Payer: COMMERCIAL

## 2024-10-18 VITALS
HEART RATE: 79 BPM | DIASTOLIC BLOOD PRESSURE: 78 MMHG | SYSTOLIC BLOOD PRESSURE: 119 MMHG | RESPIRATION RATE: 18 BRPM | TEMPERATURE: 98.6 F

## 2024-10-18 VITALS — DIASTOLIC BLOOD PRESSURE: 80 MMHG | SYSTOLIC BLOOD PRESSURE: 116 MMHG | BODY MASS INDEX: 28.84 KG/M2 | WEIGHT: 168 LBS

## 2024-10-18 DIAGNOSIS — Z34.80 SUPERVISION OF OTHER NORMAL PREGNANCY, ANTEPARTUM: Primary | ICD-10-CM

## 2024-10-18 DIAGNOSIS — O28.8 AFI (AMNIOTIC FLUID INDEX) BORDERLINE LOW: ICD-10-CM

## 2024-10-18 LAB
A1 MICROGLOB PLACENTAL VAG QL: NEGATIVE
GLUCOSE UR STRIP-MCNC: NEGATIVE MG/DL
PROT UR STRIP-MCNC: NEGATIVE MG/DL

## 2024-10-18 PROCEDURE — 84112 EVAL AMNIOTIC FLUID PROTEIN: CPT | Performed by: OBSTETRICS & GYNECOLOGY

## 2024-10-18 PROCEDURE — 59025 FETAL NON-STRESS TEST: CPT

## 2024-10-18 PROCEDURE — G0463 HOSPITAL OUTPT CLINIC VISIT: HCPCS

## 2024-10-18 PROCEDURE — 76815 OB US LIMITED FETUS(S): CPT

## 2024-10-18 NOTE — OBED NOTES
SÁNCHEZ Figueroa  Obstetric History and Physical    Chief Complaint   Patient presents with    Leaking Fluid       Subjective     PNC provided by:  GISELLE    HPI:    Patient is a 24 y.o. female  currently at 39w5d, who presents with ?SROM today;  woke up this am and had a gush of fluid when stood up;  none since;  occ ctx, no vb;  good FM.    Her prenatal care is benign.  Her previous obstetric/gynecological history is noted for is non-contributory.    The following portions of the patients history were reviewed and updated as appropriate:   current medications, allergies, past medical history, past surgical history, past family history, past social history and current problem list.     Prenatal Information:  Prenatal Results       Initial Prenatal Labs       Test Value Reference Range Date Time    Hemoglobin  12.6 g/dL 12.0 - 15.9 24 1456    Hematocrit  38.0 % 34.0 - 46.6 24 1456    Platelets  301 10*3/mm3 140 - 450 24 1456    Rubella IgG  1.13 index Immune >0.99 24 1456    Hepatitis B SAg  Non-Reactive  Non-Reactive 24 1456    Hepatitis C Ab  Non-Reactive  Non-Reactive 24 1456    RPR        T. Pallidum Ab   Non-Reactive  Non-Reactive 24 1130       Non-Reactive  Non-Reactive 24 1456    ABO  O   24 1130    Rh  Negative   24 1130    Antibody Screen  Negative   24 1456    HIV  Non-Reactive  Non-Reactive 24 1456    Urine Culture  No growth   24 1028       >100,000 CFU/mL Klebsiella pneumoniae ssp pneumoniae   24 1456    Gonorrhea  Negative  Negative 24 1456    Chlamydia  Negative  Negative 24 1456    TSH        HgB A1c         Varicella IgG        Hemoglobinopathy Fractionation  Comment   24 1456    Hemoglobinopathy (genetic testing)        Cystic fibrosis                   Fetal testing        Test Value Reference Range Date Time    NIPT        MSAFP        AFP-4                  2nd and 3rd Trimester       Test Value  Reference Range Date Time    Hemoglobin (repeated)  11.9 g/dL 12.0 - 15.9 08/30/24 1434       11.5 g/dL 12.0 - 15.9 07/05/24 1511    Hematocrit (repeated)  36.1 % 34.0 - 46.6 08/30/24 1434       35.4 % 34.0 - 46.6 07/05/24 1511    Platelets   212 10*3/mm3 140 - 450 08/30/24 1434       248 10*3/mm3 140 - 450 07/05/24 1511       301 10*3/mm3 140 - 450 04/04/24 1456    1 hour GTT   87 mg/dL 65 - 139 07/05/24 1511    Antibody Screen (repeated)  Negative   08/06/24 1130    3rd TM syphilis scrn (repeated)  RPR         3rd TM syphilis scrn (repeated) TP-Ab  Non-Reactive  Non-Reactive 08/06/24 1130    3rd TM syphilis screen TB-Ab (FTA)  Non-Reactive  Non-Reactive 08/06/24 1130    Syphilis cascade test TP-Ab (EIA)        Syphilis cascade TPPA        GTT Fasting        GTT 1 Hr        GTT 2 Hr        GTT 3 Hr        Group B Strep  Negative  Negative 09/26/24 1030              Other testing        Test Value Reference Range Date Time    Parvo IgG         CMV IgG                   Drug Screening       Test Value Reference Range Date Time    Amphetamine Screen        Barbiturate Screen  Negative  Negative 04/04/24 1456    Benzodiazepine Screen  Negative  Negative 04/04/24 1456    Methadone Screen  Negative  Negative 04/04/24 1456    Phencyclidine Screen        Opiates Screen  Negative  Negative 04/04/24 1456    THC Screen  Negative  Negative 04/04/24 1456    Cocaine Screen  Negative  Negative 04/04/24 1456    Propoxyphene Screen        Buprenorphine Screen        Methamphetamine Screen        Oxycodone Screen  Negative  Negative 04/04/24 1456    Tricyclic Antidepressants Screen                  Legend    ^: Historical                          External Prenatal Results       Pregnancy Outside Results - Transcribed From Office Records - See Scanned Records For Details       Test Value Date Time    ABO  O  08/06/24 1130    Rh  Negative  08/06/24 1130    Antibody Screen  Negative  08/06/24 1130       Negative  04/04/24 1456     Varicella IgG       Rubella  1.13 index 24 1456    Hgb  11.9 g/dL 24 1434       11.5 g/dL 24 1511       12.6 g/dL 24 1456    Hct  36.1 % 24 1434       35.4 % 24 1511       38.0 % 24 1456    HgB A1c        1h GTT  87 mg/dL 24 1511    3h GTT Fasting       3h GTT 1 hour       3h GTT 2 hour       3h GTT 3 hour        Gonorrhea (discrete)  Negative  24 1456    Chlamydia (discrete)  Negative  24 1456    RPR       Syphils cascade: TP-Ab (FTA)  Non-Reactive  24 1130       Non-Reactive  24 1456    TP-Ab  Non-Reactive  24 1130       Non-Reactive  24 1456    TP-Ab (EIA)       TPPA       HBsAg  Non-Reactive  24 1456    Herpes Simplex Virus PCR       Herpes Simplex VIrus Culture       HIV  Non-Reactive  24 1456    Hep C RNA Quant PCR       Hep C Antibody  Non-Reactive  24 1456    AFP       NIPT       Cystic Fibroisis        Group B Strep  Negative  24 1030    GBS Susceptibility to Clindamycin       GBS Susceptibility to Erythromycin       Fetal Fibronectin       Genetic Testing, Maternal Blood                 Drug Screening       Test Value Date Time    Urine Drug Screen       Amphetamine Screen       Barbiturate Screen  Negative  24 1456    Benzodiazepine Screen  Negative  24 1456    Methadone Screen  Negative  24 1456    Phencyclidine Screen       Opiates Screen  Negative  24 1456    THC Screen  Negative  24 1456    Cocaine Screen       Propoxyphene Screen       Buprenorphine Screen       Methamphetamine Screen       Oxycodone Screen  Negative  24 1456    Tricyclic Antidepressants Screen                 Legend    ^: Historical                             Problem List:     Patient Active Problem List   Diagnosis    Supervision of other normal pregnancy, antepartum    Rh negative, antepartum        Past OB History:     OB History    Para Term  AB Living   3 1 1 0 1 1   SAB  IAB Ectopic Molar Multiple Live Births   1 0 0 0 0 1      # Outcome Date GA Lbr Randal/2nd Weight Sex Type Anes PTL Lv   3 Current            2 SAB 2023 4w0d    SAB      1 Term 2018 40w0d  2892 g (6 lb 6 oz) M Vag-Spont   VIDAL       Past Medical History: Past Medical History:   Diagnosis Date    Migraine       Past Surgical History No past surgical history on file.   Family History: Family History   Problem Relation Age of Onset    Cancer Father     Ovarian cancer Paternal Grandmother     Uterine cancer Paternal Grandmother     Breast cancer Paternal Grandmother 50    Uterine cancer Maternal Grandmother     Prostate cancer Neg Hx     Colon cancer Neg Hx     Alcohol abuse Neg Hx     Arthritis Neg Hx     Asthma Neg Hx     Birth defects Neg Hx     Bleeding Disorder Neg Hx       Social History:  reports that she has never smoked. She has never used smokeless tobacco.   reports that she does not currently use alcohol.   reports no history of drug use.        General ROS: Pertinent items are noted in HPI        Home Medications:  Prenatal Gummies/DHA & FA    Allergies:  Allergies   Allergen Reactions    Penicillins Rash       Objective       Vital Signs Range for the last 24 hours  Temperature: Temp:  [98.6 °F (37 °C)] 98.6 °F (37 °C)   Temp Source: Temp src: Oral   BP: BP: (119)/(78) 119/78   Pulse: Heart Rate:  [79] 79   Respirations: Resp:  [18] 18   SPO2:       Physical Examination:   General appearance - alert, well appearing, and in no distress  Mental status - alert, oriented to person, place, and time  Abdomen - soft, nontender, nondistended,   Pelvic - normal external genitalia, vulva, vagina, cervix, and uterus   Back exam - full range of motion, no tenderness or pain on motion  Neurological - alert, oriented, normal speech  Extremities - peripheral pulses normal  Skin - normal coloration and turgor, no suspicious skin lesions noted    Presentation: vtx   Cervix: Method: sterile vaginal exam performed   Dilation:  "Cervical Dilation (cm): 2-3   Effacement: Cervical Effacement: 80   Station:         Fetal Heart Rate Assessment :  130s, R, GLTV, cat 1  Method: Fetal HR Assessment Method: external   Beats/min: Fetal HR (beats/min): 135   Baseline: Fetal HR Baseline: normal range   Variability: Fetal HR Variability: moderate (amplitude range 6 to 25 bpm)   Accels: Fetal HR Accelerations: greater than/equal to 15 bpm, lasting at least 15 seconds   Decels: Fetal HR Decelerations: absent   Tracing Category:       Uterine Assessment :  irreg  Method:     Frequency (min): Contraction Frequency (Minutes): q 4-6mins, patient reports \"a few\" contractions   Ctx Count in 10 min:     Duration:     Intensity:     [place] Units:       Lab Results (last 24 hours)       Procedure Component Value Units Date/Time    Rapid Assay For ROM - Amniotic Fluid, Amniotic Sac [[id number]]  (Normal) Collected: 10/18/24 1019    Specimen: Amniotic Fluid from Amniotic Sac Updated: 10/18/24 1037     Rupture of Membranes Negative    Narrative:      This test detects tiny amounts of amniotic fluid and is  approved for use at any gestational age. When there is   significant presence of blood on the swab, the test can   malfunction and is not recommended (possible false positive  results). In cases of only trace amounts of blood on the swab,  the test still functions properly and will not interfere with  the test results. In very rare cases when a sample is taken  12 hours or later after a rupture, a false negative result may  occur due to obstruction of the rupture by fetus or resealing  of the amniotic sac.           MILANA - 10.9    GBS is negative     Assessment & Plan     False labor    Assessment:  1.  Intrauterine pregnancy at 39w5d gestation with reactive fetal status.    2.  labor  false  3.  Obstetrical history significant for is non-contributory.  4.  GBS status:   Group B Strep, DNA   Date Value Ref Range Status   09/26/2024 Negative Negative Final "       Plan:  1. Discharge to home.  As no evidence of labor;  pt sched for appt at 1  2.  Plan of care has been reviewed with patient and patient agrees.   3.  Risks, benefits of treatment plan have been discussed.  4.  All questions have been answered.        Electronically signed by Diana Canela MD, 10/18/24, 10:47 AM EDT.

## 2024-10-18 NOTE — NURSING NOTE
"Patient presents to Labor and Delivery triage with c/o lower abdominal cramping and leaking fluid.  Dr. Canela at nurses station when lena arrived, aware of complaints.   at 39.5 weeks, patient of [facility], reports that she had used the bathroom then had a \"small gush of fluid that came out.\"  Reports occasional contraction.  Assisted to labor bed, changed to patient gown, clean catch UA collected.  Placed on external fetal monitors.  Abdomen palpates soft, 1 mild contraction palpated.  FHT's 135 with accels noted.  Reports normal fetal movement.  SVE performed.  No obvious LOF noted.  Vaginal vault moist with milky discharge noted.  Amnisure collected and sent to lab.  SVE 2-3/80/-3.  Bed low, call light within reach.  Will monitor and await results.  "

## 2024-10-20 ENCOUNTER — ANESTHESIA (OUTPATIENT)
Dept: LABOR AND DELIVERY | Facility: HOSPITAL | Age: 24
End: 2024-10-20
Payer: COMMERCIAL

## 2024-10-20 ENCOUNTER — ANESTHESIA EVENT (OUTPATIENT)
Dept: LABOR AND DELIVERY | Facility: HOSPITAL | Age: 24
End: 2024-10-20
Payer: COMMERCIAL

## 2024-10-20 ENCOUNTER — HOSPITAL ENCOUNTER (INPATIENT)
Facility: HOSPITAL | Age: 24
LOS: 2 days | Discharge: HOME | End: 2024-10-22
Attending: OBSTETRICS & GYNECOLOGY | Admitting: OBSTETRICS & GYNECOLOGY
Payer: COMMERCIAL

## 2024-10-20 PROBLEM — Z34.80 SUPERVISION OF OTHER NORMAL PREGNANCY, ANTEPARTUM: Status: RESOLVED | Noted: 2024-04-04 | Resolved: 2024-10-20

## 2024-10-20 PROBLEM — Z37.9 NORMAL LABOR: Status: ACTIVE | Noted: 2024-10-20

## 2024-10-20 LAB
ABO GROUP BLD: NORMAL
AMPHET+METHAMPHET UR QL: NEGATIVE
AMPHETAMINES UR QL: NEGATIVE
ATMOSPHERIC PRESS: 754.1 MMHG
ATMOSPHERIC PRESS: 755.6 MMHG
BARBITURATES UR QL SCN: NEGATIVE
BASE EXCESS BLDCOA CALC-SCNC: -1.8 MMOL/L (ref -2–2)
BASE EXCESS BLDCOV CALC-SCNC: -1.6 MMOL/L (ref -30–30)
BASOPHILS # BLD AUTO: 0.06 10*3/MM3 (ref 0–0.2)
BASOPHILS NFR BLD AUTO: 0.6 % (ref 0–1.5)
BENZODIAZ UR QL SCN: NEGATIVE
BILIRUB BLD-MCNC: NEGATIVE MG/DL
BLD GP AB SCN SERPL QL: POSITIVE
BUPRENORPHINE SERPL-MCNC: NEGATIVE NG/ML
CANNABINOIDS SERPL QL: NEGATIVE
CLARITY, POC: CLEAR
COCAINE UR QL: NEGATIVE
COLOR UR: YELLOW
DEPRECATED RDW RBC AUTO: 42.5 FL (ref 37–54)
EOSINOPHIL # BLD AUTO: 0.12 10*3/MM3 (ref 0–0.4)
EOSINOPHIL NFR BLD AUTO: 1.2 % (ref 0.3–6.2)
ERYTHROCYTE [DISTWIDTH] IN BLOOD BY AUTOMATED COUNT: 13.8 % (ref 12.3–15.4)
FENTANYL UR-MCNC: NEGATIVE NG/ML
GLUCOSE UR STRIP-MCNC: NEGATIVE MG/DL
HCO3 BLDCOA-SCNC: 24.1 MMOL/L
HCO3 BLDCOV-SCNC: 23.9 MMOL/L
HCT VFR BLD AUTO: 36.8 % (ref 34–46.6)
HGB BLD-MCNC: 12 G/DL (ref 12–15.9)
IMM GRANULOCYTES # BLD AUTO: 0.04 10*3/MM3 (ref 0–0.05)
IMM GRANULOCYTES NFR BLD AUTO: 0.4 % (ref 0–0.5)
KETONES UR QL: NEGATIVE
LEUKOCYTE EST, POC: ABNORMAL
LYMPHOCYTES # BLD AUTO: 2.95 10*3/MM3 (ref 0.7–3.1)
LYMPHOCYTES NFR BLD AUTO: 30.4 % (ref 19.6–45.3)
MCH RBC QN AUTO: 27.7 PG (ref 26.6–33)
MCHC RBC AUTO-ENTMCNC: 32.6 G/DL (ref 31.5–35.7)
MCV RBC AUTO: 85 FL (ref 79–97)
METHADONE UR QL SCN: NEGATIVE
MONOCYTES # BLD AUTO: 0.61 10*3/MM3 (ref 0.1–0.9)
MONOCYTES NFR BLD AUTO: 6.3 % (ref 5–12)
NEUTROPHILS NFR BLD AUTO: 5.91 10*3/MM3 (ref 1.7–7)
NEUTROPHILS NFR BLD AUTO: 61.1 % (ref 42.7–76)
NITRITE UR-MCNC: NEGATIVE MG/ML
NRBC BLD AUTO-RTO: 0 /100 WBC (ref 0–0.2)
OPIATES UR QL: NEGATIVE
OXYCODONE UR QL SCN: NEGATIVE
PCO2 BLDCOA: 43.9 MMHG (ref 33–49)
PCO2 BLDCOV: 42.2 MM HG (ref 35–51.3)
PCP UR QL SCN: NEGATIVE
PH BLDCOA: 7.35 PH UNITS (ref 7.18–7.34)
PH BLDCOV: 7.36 PH UNITS (ref 7.26–7.4)
PH UR: 7 [PH] (ref 5–8)
PLATELET # BLD AUTO: 170 10*3/MM3 (ref 140–450)
PMV BLD AUTO: 13 FL (ref 6–12)
PO2 BLDCOA: 26.5 MMHG
PO2 BLDCOV: 25.5 MM HG (ref 19–39)
PROT UR STRIP-MCNC: NEGATIVE MG/DL
RBC # BLD AUTO: 4.33 10*6/MM3 (ref 3.77–5.28)
RBC # UR STRIP: NEGATIVE /UL
RESIDUAL RHIG DETECTED: NORMAL
RH BLD: NEGATIVE
SAO2 % BLDCOA: 45.6 %
SAO2 % BLDCOV: 44.1 %
SP GR UR: 1.01 (ref 1–1.03)
T&S EXPIRATION DATE: NORMAL
TREPONEMA PALLIDUM IGG+IGM AB [PRESENCE] IN SERUM OR PLASMA BY IMMUNOASSAY: NORMAL
TRICYCLICS UR QL SCN: NEGATIVE
UROBILINOGEN UR QL: ABNORMAL
WBC NRBC COR # BLD AUTO: 9.69 10*3/MM3 (ref 3.4–10.8)

## 2024-10-20 PROCEDURE — 25810000003 LACTATED RINGERS SOLUTION: Performed by: OBSTETRICS & GYNECOLOGY

## 2024-10-20 PROCEDURE — 86870 RBC ANTIBODY IDENTIFICATION: CPT | Performed by: OBSTETRICS & GYNECOLOGY

## 2024-10-20 PROCEDURE — 25810000003 SODIUM CHLORIDE 0.9 % SOLUTION: Performed by: OBSTETRICS & GYNECOLOGY

## 2024-10-20 PROCEDURE — C1755 CATHETER, INTRASPINAL: HCPCS | Performed by: NURSE ANESTHETIST, CERTIFIED REGISTERED

## 2024-10-20 PROCEDURE — 25010000002 FENTANYL CITRATE (PF) 50 MCG/ML SOLUTION: Performed by: NURSE ANESTHETIST, CERTIFIED REGISTERED

## 2024-10-20 PROCEDURE — 81002 URINALYSIS NONAUTO W/O SCOPE: CPT | Performed by: OBSTETRICS & GYNECOLOGY

## 2024-10-20 PROCEDURE — 80307 DRUG TEST PRSMV CHEM ANLYZR: CPT | Performed by: OBSTETRICS & GYNECOLOGY

## 2024-10-20 PROCEDURE — 25010000002 ROPIVACAINE PER 1 MG: Performed by: NURSE ANESTHETIST, CERTIFIED REGISTERED

## 2024-10-20 PROCEDURE — 59400 OBSTETRICAL CARE: CPT | Performed by: OBSTETRICS & GYNECOLOGY

## 2024-10-20 PROCEDURE — 25010000002 OXYTOCIN PER 10 UNITS: Performed by: OBSTETRICS & GYNECOLOGY

## 2024-10-20 PROCEDURE — 85025 COMPLETE CBC W/AUTO DIFF WBC: CPT | Performed by: OBSTETRICS & GYNECOLOGY

## 2024-10-20 PROCEDURE — 86780 TREPONEMA PALLIDUM: CPT | Performed by: OBSTETRICS & GYNECOLOGY

## 2024-10-20 PROCEDURE — 0HQ9XZZ REPAIR PERINEUM SKIN, EXTERNAL APPROACH: ICD-10-PCS | Performed by: OBSTETRICS & GYNECOLOGY

## 2024-10-20 PROCEDURE — 86901 BLOOD TYPING SEROLOGIC RH(D): CPT | Performed by: OBSTETRICS & GYNECOLOGY

## 2024-10-20 PROCEDURE — 82803 BLOOD GASES ANY COMBINATION: CPT

## 2024-10-20 PROCEDURE — 86900 BLOOD TYPING SEROLOGIC ABO: CPT | Performed by: OBSTETRICS & GYNECOLOGY

## 2024-10-20 PROCEDURE — 86850 RBC ANTIBODY SCREEN: CPT | Performed by: OBSTETRICS & GYNECOLOGY

## 2024-10-20 RX ORDER — SODIUM CHLORIDE 0.9 % (FLUSH) 0.9 %
10 SYRINGE (ML) INJECTION AS NEEDED
Status: DISCONTINUED | OUTPATIENT
Start: 2024-10-20 | End: 2024-10-20

## 2024-10-20 RX ORDER — ONDANSETRON 4 MG/1
4 TABLET, ORALLY DISINTEGRATING ORAL EVERY 6 HOURS PRN
Status: DISCONTINUED | OUTPATIENT
Start: 2024-10-20 | End: 2024-10-22 | Stop reason: HOSPADM

## 2024-10-20 RX ORDER — ONDANSETRON 4 MG/1
4 TABLET, ORALLY DISINTEGRATING ORAL EVERY 6 HOURS PRN
Status: DISCONTINUED | OUTPATIENT
Start: 2024-10-20 | End: 2024-10-20 | Stop reason: HOSPADM

## 2024-10-20 RX ORDER — THIAMINE HYDROCHLORIDE 100 MG/ML
100 INJECTION, SOLUTION INTRAMUSCULAR; INTRAVENOUS ONCE
Status: CANCELLED | OUTPATIENT
Start: 2024-10-20 | End: 2024-10-20

## 2024-10-20 RX ORDER — SODIUM CHLORIDE 0.9 % (FLUSH) 0.9 %
10 SYRINGE (ML) INJECTION EVERY 8 HOURS
Status: DISCONTINUED | OUTPATIENT
Start: 2024-10-20 | End: 2024-10-20

## 2024-10-20 RX ORDER — IBUPROFEN 600 MG/1
600 TABLET, FILM COATED ORAL EVERY 6 HOURS
Status: DISCONTINUED | OUTPATIENT
Start: 2024-10-20 | End: 2024-10-20 | Stop reason: HOSPADM

## 2024-10-20 RX ORDER — LIDOCAINE HYDROCHLORIDE AND EPINEPHRINE 15; 5 MG/ML; UG/ML
INJECTION, SOLUTION EPIDURAL
Status: COMPLETED | OUTPATIENT
Start: 2024-10-20 | End: 2024-10-20

## 2024-10-20 RX ORDER — FAMOTIDINE 10 MG/ML
20 INJECTION, SOLUTION INTRAVENOUS ONCE AS NEEDED
Status: DISCONTINUED | OUTPATIENT
Start: 2024-10-20 | End: 2024-10-20 | Stop reason: HOSPADM

## 2024-10-20 RX ORDER — OXYTOCIN/0.9 % SODIUM CHLORIDE 30/500 ML
125 PLASTIC BAG, INJECTION (ML) INTRAVENOUS ONCE AS NEEDED
Status: DISCONTINUED | OUTPATIENT
Start: 2024-10-20 | End: 2024-10-22 | Stop reason: HOSPADM

## 2024-10-20 RX ORDER — EPHEDRINE SULFATE 50 MG/ML
5 INJECTION, SOLUTION INTRAVENOUS
Status: DISCONTINUED | OUTPATIENT
Start: 2024-10-20 | End: 2024-10-20

## 2024-10-20 RX ORDER — HYDROCODONE BITARTRATE AND ACETAMINOPHEN 10; 325 MG/1; MG/1
1 TABLET ORAL EVERY 4 HOURS PRN
Status: DISCONTINUED | OUTPATIENT
Start: 2024-10-20 | End: 2024-10-22 | Stop reason: HOSPADM

## 2024-10-20 RX ORDER — ACETAMINOPHEN 325 MG/1
650 TABLET ORAL EVERY 4 HOURS PRN
Status: DISCONTINUED | OUTPATIENT
Start: 2024-10-20 | End: 2024-10-20 | Stop reason: SDUPTHER

## 2024-10-20 RX ORDER — HYDROCODONE BITARTRATE AND ACETAMINOPHEN 5; 325 MG/1; MG/1
1 TABLET ORAL EVERY 4 HOURS PRN
Status: DISCONTINUED | OUTPATIENT
Start: 2024-10-20 | End: 2024-10-22 | Stop reason: HOSPADM

## 2024-10-20 RX ORDER — ROPIVACAINE HYDROCHLORIDE 2 MG/ML
INJECTION, SOLUTION EPIDURAL; INFILTRATION; PERINEURAL
Status: COMPLETED | OUTPATIENT
Start: 2024-10-20 | End: 2024-10-20

## 2024-10-20 RX ORDER — MISOPROSTOL 200 UG/1
200 TABLET ORAL
Status: COMPLETED | OUTPATIENT
Start: 2024-10-20 | End: 2024-10-20

## 2024-10-20 RX ORDER — FENTANYL CITRATE 50 UG/ML
INJECTION, SOLUTION INTRAMUSCULAR; INTRAVENOUS
Status: COMPLETED | OUTPATIENT
Start: 2024-10-20 | End: 2024-10-20

## 2024-10-20 RX ORDER — OXYTOCIN/0.9 % SODIUM CHLORIDE 30/500 ML
999 PLASTIC BAG, INJECTION (ML) INTRAVENOUS ONCE
Status: DISCONTINUED | OUTPATIENT
Start: 2024-10-20 | End: 2024-10-20 | Stop reason: HOSPADM

## 2024-10-20 RX ORDER — ONDANSETRON 2 MG/ML
4 INJECTION INTRAMUSCULAR; INTRAVENOUS EVERY 6 HOURS PRN
Status: DISCONTINUED | OUTPATIENT
Start: 2024-10-20 | End: 2024-10-20 | Stop reason: HOSPADM

## 2024-10-20 RX ORDER — SODIUM CHLORIDE, SODIUM LACTATE, POTASSIUM CHLORIDE, CALCIUM CHLORIDE 600; 310; 30; 20 MG/100ML; MG/100ML; MG/100ML; MG/100ML
75 INJECTION, SOLUTION INTRAVENOUS CONTINUOUS
Status: ACTIVE | OUTPATIENT
Start: 2024-10-20 | End: 2024-10-20

## 2024-10-20 RX ORDER — ONDANSETRON 2 MG/ML
4 INJECTION INTRAMUSCULAR; INTRAVENOUS EVERY 6 HOURS PRN
Status: DISCONTINUED | OUTPATIENT
Start: 2024-10-20 | End: 2024-10-20 | Stop reason: SDUPTHER

## 2024-10-20 RX ORDER — OXYTOCIN/0.9 % SODIUM CHLORIDE 30/500 ML
2 PLASTIC BAG, INJECTION (ML) INTRAVENOUS
Status: DISCONTINUED | OUTPATIENT
Start: 2024-10-20 | End: 2024-10-20

## 2024-10-20 RX ORDER — ACETAMINOPHEN 325 MG/1
650 TABLET ORAL EVERY 6 HOURS PRN
Status: DISCONTINUED | OUTPATIENT
Start: 2024-10-20 | End: 2024-10-22 | Stop reason: HOSPADM

## 2024-10-20 RX ORDER — MISOPROSTOL 200 UG/1
600 TABLET ORAL ONCE
Status: DISCONTINUED | OUTPATIENT
Start: 2024-10-20 | End: 2024-10-20 | Stop reason: HOSPADM

## 2024-10-20 RX ORDER — SODIUM CHLORIDE 0.9 % (FLUSH) 0.9 %
1-10 SYRINGE (ML) INJECTION AS NEEDED
Status: DISCONTINUED | OUTPATIENT
Start: 2024-10-20 | End: 2024-10-22 | Stop reason: HOSPADM

## 2024-10-20 RX ORDER — PRENATAL VIT/IRON FUM/FOLIC AC 27MG-0.8MG
1 TABLET ORAL DAILY
Status: DISCONTINUED | OUTPATIENT
Start: 2024-10-20 | End: 2024-10-22 | Stop reason: HOSPADM

## 2024-10-20 RX ORDER — FENTANYL/ROPIVACAINE/NS/PF 2MCG/ML-.2
PLASTIC BAG, INJECTION (ML) INJECTION
Status: COMPLETED
Start: 2024-10-20 | End: 2024-10-20

## 2024-10-20 RX ORDER — CALCIUM CARBONATE 500 MG/1
2 TABLET, CHEWABLE ORAL 3 TIMES DAILY PRN
Status: DISCONTINUED | OUTPATIENT
Start: 2024-10-20 | End: 2024-10-22 | Stop reason: HOSPADM

## 2024-10-20 RX ORDER — MAGNESIUM CARB/ALUMINUM HYDROX 105-160MG
30 TABLET,CHEWABLE ORAL ONCE
Status: DISCONTINUED | OUTPATIENT
Start: 2024-10-20 | End: 2024-10-20

## 2024-10-20 RX ORDER — TERBUTALINE SULFATE 1 MG/ML
0.25 INJECTION, SOLUTION SUBCUTANEOUS AS NEEDED
Status: DISCONTINUED | OUTPATIENT
Start: 2024-10-20 | End: 2024-10-20

## 2024-10-20 RX ORDER — IBUPROFEN 600 MG/1
600 TABLET, FILM COATED ORAL EVERY 6 HOURS PRN
Status: DISCONTINUED | OUTPATIENT
Start: 2024-10-20 | End: 2024-10-22 | Stop reason: HOSPADM

## 2024-10-20 RX ORDER — FENTANYL CITRATE 50 UG/ML
INJECTION, SOLUTION INTRAMUSCULAR; INTRAVENOUS
Status: COMPLETED
Start: 2024-10-20 | End: 2024-10-20

## 2024-10-20 RX ORDER — FAMOTIDINE 20 MG/1
20 TABLET, FILM COATED ORAL ONCE AS NEEDED
Status: DISCONTINUED | OUTPATIENT
Start: 2024-10-20 | End: 2024-10-20 | Stop reason: HOSPADM

## 2024-10-20 RX ORDER — BISACODYL 10 MG
10 SUPPOSITORY, RECTAL RECTAL DAILY PRN
Status: DISCONTINUED | OUTPATIENT
Start: 2024-10-21 | End: 2024-10-22 | Stop reason: HOSPADM

## 2024-10-20 RX ORDER — ONDANSETRON 4 MG/1
4 TABLET, ORALLY DISINTEGRATING ORAL EVERY 6 HOURS PRN
Status: DISCONTINUED | OUTPATIENT
Start: 2024-10-20 | End: 2024-10-20 | Stop reason: SDUPTHER

## 2024-10-20 RX ORDER — DOCUSATE SODIUM 100 MG/1
100 CAPSULE, LIQUID FILLED ORAL 2 TIMES DAILY
Status: DISCONTINUED | OUTPATIENT
Start: 2024-10-20 | End: 2024-10-22 | Stop reason: HOSPADM

## 2024-10-20 RX ORDER — ACETAMINOPHEN 325 MG/1
650 TABLET ORAL EVERY 6 HOURS
Status: DISCONTINUED | OUTPATIENT
Start: 2024-10-20 | End: 2024-10-20 | Stop reason: HOSPADM

## 2024-10-20 RX ORDER — IBUPROFEN 600 MG/1
600 TABLET, FILM COATED ORAL EVERY 6 HOURS PRN
Qty: 60 TABLET | Refills: 1 | Status: SHIPPED | OUTPATIENT
Start: 2024-10-20

## 2024-10-20 RX ORDER — OXYTOCIN/0.9 % SODIUM CHLORIDE 30/500 ML
250 PLASTIC BAG, INJECTION (ML) INTRAVENOUS CONTINUOUS
Status: ACTIVE | OUTPATIENT
Start: 2024-10-20 | End: 2024-10-20

## 2024-10-20 RX ORDER — MISOPROSTOL 100 UG/1
TABLET ORAL
Status: DISCONTINUED
Start: 2024-10-20 | End: 2024-10-20 | Stop reason: WASHOUT

## 2024-10-20 RX ADMIN — LIDOCAINE HYDROCHLORIDE AND EPINEPHRINE 3 ML: 15; 5 INJECTION, SOLUTION EPIDURAL at 05:34

## 2024-10-20 RX ADMIN — WITCH HAZEL: 500 SOLUTION RECTAL; TOPICAL at 16:45

## 2024-10-20 RX ADMIN — IBUPROFEN 600 MG: 600 TABLET, FILM COATED ORAL at 16:52

## 2024-10-20 RX ADMIN — Medication 10 ML: at 21:28

## 2024-10-20 RX ADMIN — Medication: at 16:45

## 2024-10-20 RX ADMIN — SODIUM CHLORIDE, POTASSIUM CHLORIDE, SODIUM LACTATE AND CALCIUM CHLORIDE 500 ML: 600; 310; 30; 20 INJECTION, SOLUTION INTRAVENOUS at 05:23

## 2024-10-20 RX ADMIN — MISOPROSTOL 200 MCG: 200 TABLET ORAL at 12:03

## 2024-10-20 RX ADMIN — Medication 10 ML/HR: at 05:41

## 2024-10-20 RX ADMIN — OXYTOCIN 2 MILLI-UNITS/MIN: 10 INJECTION, SOLUTION INTRAMUSCULAR; INTRAVENOUS at 07:40

## 2024-10-20 RX ADMIN — PRENATAL WITH FERROUS FUM AND FOLIC ACID 1 TABLET: 3080; 920; 120; 400; 22; 1.84; 3; 20; 10; 1; 12; 200; 27; 25; 2 TABLET ORAL at 20:20

## 2024-10-20 RX ADMIN — ROPIVACAINE HYDROCHLORIDE 8 ML: 2 INJECTION, SOLUTION EPIDURAL; INFILTRATION; PERINEURAL at 05:39

## 2024-10-20 RX ADMIN — ACETAMINOPHEN 650 MG: 325 TABLET ORAL at 12:02

## 2024-10-20 RX ADMIN — HYDROCODONE BITARTRATE AND ACETAMINOPHEN 1 TABLET: 5; 325 TABLET ORAL at 21:27

## 2024-10-20 RX ADMIN — FENTANYL CITRATE 100 MCG: 50 INJECTION, SOLUTION INTRAMUSCULAR; INTRAVENOUS at 05:39

## 2024-10-20 RX ADMIN — MISOPROSTOL 200 MCG: 200 TABLET ORAL at 16:55

## 2024-10-20 RX ADMIN — DOCUSATE SODIUM 100 MG: 100 CAPSULE, LIQUID FILLED ORAL at 20:21

## 2024-10-20 RX ADMIN — ACETAMINOPHEN 650 MG: 325 TABLET ORAL at 21:27

## 2024-10-20 NOTE — NURSING NOTE
25 YO A1 @ 40 weeks gestation presents to L&D with c/o contractions every 5 minutes since 0300 am. Reports contractions started at 2000 last night but regular at 0300a. Denies vaginal bleeding, LOF, vaginal discharge, epigastric pain, headache or blurred vision. Only medical history is migraines. Denies any OB/GYN abnormalities or any issues or abnormalities this pregnancy. See Navigator. Urine sample obtained and [facility] applied.

## 2024-10-20 NOTE — ANESTHESIA PROCEDURE NOTES
Labor Epidural    Pre-sedation assessment completed: 10/20/2024 5:29 AM    Patient reassessed immediately prior to procedure    Patient location during procedure: OB  Start Time: 10/20/2024 5:29 AM  Stop Time: 10/20/2024 5:48 AM  Performed By  CRNA/CAA: Dolly Ngo CRNA  Preanesthetic Checklist  Completed: patient identified, IV checked, site marked, risks and benefits discussed, surgical consent, monitors and equipment checked, pre-op evaluation and timeout performed  Prep:  Pt Position:sitting  Sterile Tech:gloves and sterile barrier  Prep:povidone-iodine 7.5% surgical scrub  Monitoring:blood pressure monitoring and continuous pulse oximetry  Epidural Block Procedure:  Approach:midline  Guidance:landmark technique and palpation technique  Location:L3-L4  Needle Type:Tuohy  Needle Gauge:17 G  Loss of Resistance Medium: saline  Loss of Resistance: 5cm  Cath Depth at skin:10 cm  Paresthesia: none  Aspiration:negative  Test Dose:negative  Medication: ropivacaine (NAROPIN) 0.2 % injection - Injection   8 mL - 10/20/2024 5:39:00 AM  fentaNYL citrate (PF) (SUBLIMAZE) injection - Epidural   100 mcg - 10/20/2024 5:39:00 AM  lidocaine 1.5%-EPINEPHrine 1:200,000 (XYLOCAINE W/EPI) injection - Epidural   3 mL - 10/20/2024 5:34:00 AM  Number of Attempts: 1  Post Assessment:  Dressing:biopatch applied, occlusive dressing applied and secured with tape  Pt Tolerance:patient tolerated the procedure well with no apparent complications  Complications:no

## 2024-10-20 NOTE — ANESTHESIA PREPROCEDURE EVALUATION
Anesthesia Evaluation     Patient summary reviewed   no history of anesthetic complications:   NPO Solid Status: N/A  NPO Liquid Status: N/A           Airway   Mallampati: II  TM distance: >3 FB  Neck ROM: full  No difficulty expected  Dental - normal exam     Pulmonary - negative pulmonary ROS and normal exam   Cardiovascular - negative cardio ROS and normal exam  Exercise tolerance: good (4-7 METS)        Neuro/Psych  (+) headaches  GI/Hepatic/Renal/Endo - negative ROS     Musculoskeletal (-) negative ROS    Abdominal  - normal exam   Substance History - negative use     OB/GYN    (+) Pregnant        Other - negative ROS                   Anesthesia Plan    ASA 2     epidural       Anesthetic plan, risks, benefits, and alternatives have been provided, discussed and informed consent has been obtained with: patient.  Pre-procedure education provided  Plan discussed with CRNA.    CODE STATUS:    Level Of Support Discussed With: Patient  Code Status (Patient has no pulse and is not breathing): CPR (Attempt to Resuscitate)  Medical Interventions (Patient has pulse or is breathing): Full Support

## 2024-10-20 NOTE — H&P
SÁNCHEZ Figueroa  Obstetric History and Physical    Chief Complaint   Patient presents with    Contractions     Started at 2000 last night and worsened around 0300 am.        Subjective     Patient is a 24 y.o. female  currently at 40w0d, who presents with complaint of contractions.  Positive fetal movements.  She denies any loss of fluid or vaginal bleeding.    PNC provided by:  [facility]. Her prenatal care is benign.  Her previous obstetric/gynecological history is noted for is non-contributory.    The following portions of the patients history were reviewed and updated as appropriate: current medications, allergies, past medical history, past surgical history, past family history, past social history, and problem list .       Prenatal Information:  Prenatal Results       Initial Prenatal Labs       Test Value Reference Range Date Time    Hemoglobin  12.6 g/dL 12.0 - 15.9 24 1456    Hematocrit  38.0 % 34.0 - 46.6 24 1456    Platelets  301 10*3/mm3 140 - 450 24 1456    Rubella IgG  1.13 index Immune >0.99 24 1456    Hepatitis B SAg  Non-Reactive  Non-Reactive 24 1456    Hepatitis C Ab  Non-Reactive  Non-Reactive 24 1456    RPR        T. Pallidum Ab   Non-Reactive  Non-Reactive 10/20/24 0511       Non-Reactive  Non-Reactive 24 1130       Non-Reactive  Non-Reactive 24 1456    ABO  O   10/20/24 0510    Rh  Negative   10/20/24 0510    Antibody Screen  Negative   24 1456    HIV  Non-Reactive  Non-Reactive 24 1456    Urine Culture  No growth   24 1028       >100,000 CFU/mL Klebsiella pneumoniae ssp pneumoniae   24 1456    Gonorrhea  Negative  Negative 24 1456    Chlamydia  Negative  Negative 24 1456    TSH        HgB A1c         Varicella IgG        Hemoglobinopathy Fractionation  Comment   24 1456    Hemoglobinopathy (genetic testing)        Cystic fibrosis                   Fetal testing        Test Value Reference Range Date Time     NIPT        MSAFP        AFP-4                  2nd and 3rd Trimester       Test Value Reference Range Date Time    Hemoglobin (repeated)  12.0 g/dL 12.0 - 15.9 10/20/24 0511       11.9 g/dL 12.0 - 15.9 08/30/24 1434       11.5 g/dL 12.0 - 15.9 07/05/24 1511    Hematocrit (repeated)  36.8 % 34.0 - 46.6 10/20/24 0511       36.1 % 34.0 - 46.6 08/30/24 1434       35.4 % 34.0 - 46.6 07/05/24 1511    Platelets   170 10*3/mm3 140 - 450 10/20/24 0511       212 10*3/mm3 140 - 450 08/30/24 1434       248 10*3/mm3 140 - 450 07/05/24 1511       301 10*3/mm3 140 - 450 04/04/24 1456    1 hour GTT   87 mg/dL 65 - 139 07/05/24 1511    Antibody Screen (repeated)  Positive   10/20/24 0510       Negative   08/06/24 1130    3rd TM syphilis scrn (repeated)  RPR         3rd TM syphilis scrn (repeated) TP-Ab  Non-Reactive  Non-Reactive 10/20/24 0511       Non-Reactive  Non-Reactive 08/06/24 1130    3rd TM syphilis screen TB-Ab (FTA)  Non-Reactive  Non-Reactive 10/20/24 0511       Non-Reactive  Non-Reactive 08/06/24 1130    Syphilis cascade test TP-Ab (EIA)        Syphilis cascade TPPA        GTT Fasting        GTT 1 Hr        GTT 2 Hr        GTT 3 Hr        Group B Strep  Negative  Negative 09/26/24 1030              Other testing        Test Value Reference Range Date Time    Parvo IgG         CMV IgG                   Drug Screening       Test Value Reference Range Date Time    Amphetamine Screen  Negative  Negative 10/20/24 0511    Barbiturate Screen  Negative  Negative 10/20/24 0511       Negative  Negative 04/04/24 1456    Benzodiazepine Screen  Negative  Negative 10/20/24 0511       Negative  Negative 04/04/24 1456    Methadone Screen  Negative  Negative 10/20/24 0511       Negative  Negative 04/04/24 1456    Phencyclidine Screen  Negative  Negative 10/20/24 0511    Opiates Screen  Negative  Negative 10/20/24 0511       Negative  Negative 04/04/24 1456    THC Screen  Negative  Negative 10/20/24 0511       Negative  Negative  04/04/24 1456    Cocaine Screen  Negative  Negative 10/20/24 0511       Negative  Negative 04/04/24 1456    Propoxyphene Screen        Buprenorphine Screen  Negative  Negative 10/20/24 0511    Methamphetamine Screen  Negative  Negative 10/20/24 0511    Oxycodone Screen  Negative  Negative 10/20/24 0511       Negative  Negative 04/04/24 1456    Tricyclic Antidepressants Screen  Negative  Negative 10/20/24 0511              Legend    ^: Historical                          External Prenatal Results       Pregnancy Outside Results - Transcribed From Office Records - See Scanned Records For Details       Test Value Date Time    ABO  O  10/20/24 0510    Rh  Negative  10/20/24 0510    Antibody Screen  Positive  10/20/24 0510       Negative  08/06/24 1130       Negative  04/04/24 1456    Varicella IgG       Rubella  1.13 index 04/04/24 1456    Hgb  12.0 g/dL 10/20/24 0511       11.9 g/dL 08/30/24 1434       11.5 g/dL 07/05/24 1511       12.6 g/dL 04/04/24 1456    Hct  36.8 % 10/20/24 0511       36.1 % 08/30/24 1434       35.4 % 07/05/24 1511       38.0 % 04/04/24 1456    HgB A1c        1h GTT  87 mg/dL 07/05/24 1511    3h GTT Fasting       3h GTT 1 hour       3h GTT 2 hour       3h GTT 3 hour        Gonorrhea (discrete)  Negative  04/04/24 1456    Chlamydia (discrete)  Negative  04/04/24 1456    RPR       Syphils cascade: TP-Ab (FTA)  Non-Reactive  10/20/24 0511       Non-Reactive  08/06/24 1130       Non-Reactive  04/04/24 1456    TP-Ab  Non-Reactive  10/20/24 0511       Non-Reactive  08/06/24 1130       Non-Reactive  04/04/24 1456    TP-Ab (EIA)       TPPA       HBsAg  Non-Reactive  04/04/24 1456    Herpes Simplex Virus PCR       Herpes Simplex VIrus Culture       HIV  Non-Reactive  04/04/24 1456    Hep C RNA Quant PCR       Hep C Antibody  Non-Reactive  04/04/24 1456    AFP       NIPT       Cystic Fibroisis        Group B Strep  Negative  09/26/24 1030    GBS Susceptibility to Clindamycin       GBS Susceptibility to  Erythromycin       Fetal Fibronectin       Genetic Testing, Maternal Blood                 Drug Screening       Test Value Date Time    Urine Drug Screen       Amphetamine Screen  Negative  10/20/24 0511    Barbiturate Screen  Negative  10/20/24 0511       Negative  24 1456    Benzodiazepine Screen  Negative  10/20/24 0511       Negative  24 1456    Methadone Screen  Negative  10/20/24 0511       Negative  24 1456    Phencyclidine Screen  Negative  10/20/24 0511    Opiates Screen  Negative  10/20/24 0511       Negative  24 1456    THC Screen  Negative  10/20/24 0511       Negative  24 1456    Cocaine Screen       Propoxyphene Screen       Buprenorphine Screen  Negative  10/20/24 0511    Methamphetamine Screen       Oxycodone Screen  Negative  10/20/24 0511       Negative  24 1456    Tricyclic Antidepressants Screen  Negative  10/20/24 0511              Legend    ^: Historical                             Past OB History:     OB History    Para Term  AB Living   3 1 1 0 1 1   SAB IAB Ectopic Molar Multiple Live Births   1 0 0 0 0 1      # Outcome Date GA Lbr Randal/2nd Weight Sex Type Anes PTL Lv   3 Current            2 SAB  4w0d    SAB      1 Term 2018 40w0d  2892 g (6 lb 6 oz) M Vag-Spont   VIDAL       Past Medical History: Past Medical History:   Diagnosis Date    Migraine       Past Surgical History History reviewed. No pertinent surgical history.   Family History: Family History   Problem Relation Age of Onset    Cancer Father     Ovarian cancer Paternal Grandmother     Uterine cancer Paternal Grandmother     Breast cancer Paternal Grandmother 50    Uterine cancer Maternal Grandmother     Deep vein thrombosis Maternal Aunt         unsure as to clinical hx, maybe had cancer    Prostate cancer Neg Hx     Colon cancer Neg Hx     Alcohol abuse Neg Hx     Arthritis Neg Hx     Asthma Neg Hx     Birth defects Neg Hx     Bleeding Disorder Neg Hx     Pulmonary embolism Neg  Hx       Social History:  reports that she has never smoked. She has never used smokeless tobacco.   reports that she does not currently use alcohol.   reports no history of drug use.        General ROS: Pertinent items are noted in HPI    Objective       Vital Signs Range for the last 24 hours  Temperature: Temp:  [97.8 °F (36.6 °C)] 97.8 °F (36.6 °C)   Temp Source: Temp src: Oral   BP: BP: (103-134)/(60-91) 109/70   Pulse: Heart Rate:  [57-86] 65   Respirations: Resp:  [18] 18   SPO2: SpO2:  [98 %-100 %] 100 %   O2 Amount (l/min):     O2 Devices     Weight: Weight:  [76.2 kg (168 lb)] 76.2 kg (168 lb)     Physical Examination: General appearance - alert, well appearing, and in no distress  Mental status - alert, oriented to person, place, and time  Chest - clear to auscultation, no wheezes, rales or rhonchi, symmetric air entry  Heart - normal rate, regular rhythm, normal S1, S2, no murmurs, rubs, clicks or gallops  Abdomen - soft, nontender, gravid  Extremities - peripheral pulses normal, no pedal edema, no clubbing or cyanosis  Skin - normal coloration and turgor, no rashes, no suspicious skin lesions noted    Presentation: Vertex   Cervix: Exam by: Method: sterile vaginal exam performed (per DENISE Paez)   Dilation: Cervical Dilation (cm): 4-5   Effacement: Cervical Effacement: 80   Station:         Fetal Heart Rate Assessment   Method: Fetal HR Assessment Method: external   Beats/min: Fetal HR (beats/min): 130   Baseline: Fetal HR Baseline: normal range   Variability: Fetal HR Variability: moderate (amplitude range 6 to 25 bpm)   Accels: Fetal HR Accelerations: greater than/equal to 15 bpm, lasting at least 15 seconds   Decels: Fetal HR Decelerations: absent         Uterine Assessment   Method: Method: external tocotransducer, palpation   Frequency (min): Contraction Frequency (Minutes): 5-6   Ctx Count in 10 min:     Duration:     Intensity: Contraction Intensity: moderate by palpation       Cesar  Units:       GBS is negative      Assessment & Plan       Normal labor        Assessment:  1.  Intrauterine pregnancy at 40w0d gestation with reactive fetal status.    2.  labor  without ROM  3.  Obstetrical history significant for is non-contributory.  4.  GBS status:   Group B Strep, DNA   Date Value Ref Range Status   09/26/2024 Negative Negative Final       Plan:  1. Vaginal anticipated  2. Plan of care has been reviewed with patient and patient agrees.   3.  Risks, benefits of treatment plan have been discussed.  4.  All questions have been answered.  5.  May have epidural      Jose Miguel Sotelo MD  10/20/2024  06:51 EDT

## 2024-10-20 NOTE — L&D DELIVERY NOTE
[facility]  Vaginal Delivery Procedure Note    Patient Name: April Santos  : 2000  MRN: [id number]    Delivery Date: 10/20/2024  Delivery Time: 9:17 AM  Delivery Type: Spontaneous vaginal delivery  Gestational Age: 40w0d  Delivery Provider: Manjeet Zamudio M.D.    Anesthesia: Epidural    Infant: male infant   3295 g (7 lb 4.2 oz)  APGARS: 8  @ 1 minute / 9  @ 5 minutes  Shoulder Dystocia: No      Placenta, Cord, and Fluid    Placenta Delivered:  Spontaneous at   10/20/2024  9:22 AM    Cord: 3 vessels present.   Nuchal Cord:  no   Cord Blood Obtained: Yes   Cord Gases Obtained:  No   Cord Gas Results: Venous:    pH, Cord Venous   Date Value Ref Range Status   10/20/2024 7.362 7.260 - 7.400 pH Units Final     Base Excess, Cord Venous   Date Value Ref Range Status   10/20/2024 -1.6 -30.0 - 30.0 mmol/L Final     Comment:     Serial Number: [id number]:  [id number]       Arterial:    pH, Cord Arterial   Date Value Ref Range Status   10/20/2024 7.35 (H) 7.18 - 7.34 pH Units Final     Base Exc, Cord Arterial   Date Value Ref Range Status   10/20/2024 -1.8 -2.0 - 2.0 mmol/L Final     Comment:     Serial Number: [id number]:  [id number]          Perineum: OBPERINEUM: Right labia minora laceration    EBL: Est. Blood Loss (mL): 200 mL (Filed from Delivery Summary) (10/20/24 0984)    Complications: None    Description of Procedure: I was called to the bedside after the patient was found to be complete, complete, +4 station and pushing well. With the next pushes the patient progressed to a +5 station, and delivered a live viable male at 9:17 AM over a small right labial minora laceration. The baby delivered in the right occiput anterior presentation. After delivery of the fetal head, the anterior shoulder was delivered first, and without difficulty. This was followed by the remainder of the infant.  At no time was there a shoulder dystocia, and at no time was any fundal pressure given. After complete  delivery, the mouth and nose were bulb suctioned, the cord was doubly clamped and the baby was placed on the mother's abdomen, with a good cry. The father the baby cut the umbilical cord. Apgars were 8 pounds 9 ounces at 1 and 5 minutes. The birth weight was 7 pounds 2 ounces. Cord blood and gases were collected. The placenta was delivered spontaneously and intact at 9:22 AM. There was a 3 vessel umbilical cord present. The vagina and cervix were inspected and there was only a small right labia minora laceration.  This was reapproximated with 3-0 Vicryl suture in a running fashion.. Uterine tone was good, and lochia was scant. EBL was 2 mL. Both mother and  are recovering in excellent condition in the labor room. All counts were correct x 2 prior to my exit of the room    Electronically signed by Manjeet Zamudio MD, 10/20/24, 10:26 AM EDT.

## 2024-10-20 NOTE — PLAN OF CARE
Goal Outcome Evaluation:           Progress: improving  Outcome Evaluation: Epidural infusing. Patient doing well. VSS. FHB reactive.

## 2024-10-20 NOTE — DISCHARGE SUMMARY
[facility]         Discharge Summary    Patient Name: April Santos  : 2000  MRN: [id number]  Primary Care Physician: Walter, No Known    Date of Admission: 10/20/2024  Date of Discharge: 10/22/2024    Consults       No orders found from 2024 to 10/21/2024.             Procedures:  Spontaneous vaginal delivery    Presenting Problem:   Normal labor [O80, Z37.9]    Admitting Diagnosis:  24 y.o.  at 40w0d    Rh negative, antepartum    Normal labor    Discharge Diagnosis:  24 y.o.  at 40w0d     (normal spontaneous vaginal delivery)    Rh negative, antepartum    Normal labor      Delivery Summary     OB Surgeon: Manjeet Zamudio M.D.  Anesthesia: Epidural  Delivery Type:   Perineum: OBPERINEUM: Right labia minora  Feeding method: Breast    Infant: male infant;    Weight: 3295 g (7 lb 4.2 oz)    APGARS: 8  @ 1 minute / 9  @ 5 minutes   Venous Blood Gas:   pH, Cord Venous   Date Value Ref Range Status   10/20/2024 7.362 7.260 - 7.400 pH Units Final     Base Excess, Cord Venous   Date Value Ref Range Status   10/20/2024 -1.6 -30.0 - 30.0 mmol/L Final     Comment:     Serial Number: [id number]:  [id number]      Arterial Blood Gas:   pH, Cord Arterial   Date Value Ref Range Status   10/20/2024 7.35 (H) 7.18 - 7.34 pH Units Final     Base Exc, Cord Arterial   Date Value Ref Range Status   10/20/2024 -1.8 -2.0 - 2.0 mmol/L Final     Comment:     Serial Number: [id number]:  [id number]        Hospital Course     Hospital Course:  April Santos is a 24 y.o.  40w0d who presented on 10/20/2024 for contractions increasing intensity and frequency.  The patient was admitted with spontaneous labor at 4 to 5 cm of dilation.  She received an epidural shortly after admission.  Her progress stalled after epidural placement.  She was tried on oxytocin.  After initiation of oxytocin the patient progressed well through the active phase of labor and went on to have an  uncomplicated vaginal delivery.  For full details of that procedure please see the separate dictated delivery summary.  After initial recovery in the labor room she was transferred to the postpartum unit for further postpartum care.  She had an uncomplicated postpartum course.  By postpartum day 2, day of discharge, she was meeting all appropriate postpartum milestones.  Her lochia was appropriate.  Her infant was doing well.  Her exam was benign.  She desired discharge home.    Day of Discharge     Vital Signs:  Temp:  [97.8 °F (36.6 °C)-98.3 °F (36.8 °C)] 98.3 °F (36.8 °C)  Heart Rate:  [55-86] 61  Resp:  [16-18] 16  BP: (103-134)/(60-91) 125/75    Pertinent  and/or Most Recent Results     LAB RESULTS:       Lab 10/20/24  0511   WBC 9.69   HEMOGLOBIN 12.0   HEMATOCRIT 36.8   PLATELETS 170   NEUTROS ABS 5.91   IMMATURE GRANS (ABS) 0.04   LYMPHS ABS 2.95   MONOS ABS 0.61   EOS ABS 0.12   MCV 85.0                 Lab 10/20/24  0510   ABO TYPING O   RH TYPING Negative   ANTIBODY SCREEN Positive     URINALYSIS@  Microbiology Results (last 10 days)       ** No results found for the last 240 hours. **        US Ob Limited 1 + Fetuses    Result Date: 10/18/2024  Impression: Impression: Limited ultrasound requested to evaluate amniotic fluid index, which measures 10.9 cm.  Electronically Signed: Hali Menjivar  10/18/2024 12:02 PM EDT  Workstation ID: [id number]     Discharge Details        Discharge Medications        New Medications        Instructions Start Date   ibuprofen 600 MG tablet  Commonly known as: ADVIL,MOTRIN   600 mg, Oral, Every 6 Hours PRN             Continue These Medications        Instructions Start Date   Prenatal Gummies/DHA & FA 0.4-32.5 MG chewable tablet   1 tablet, Oral, Daily               Allergies   Allergen Reactions    Penicillins Rash       Discharge Disposition:   Home, self-care    Discharge Condition:  Good    Diet:   Regular    Discharge Activity:  Gradually resume normal activity  Pelvic  rest, nothing in the vagina, no tampons, no douching, and no intercourse for 6 weeks.    Follow Up:  With Dr. Donato in 5 weeks    Electronically signed by Manjeet Zamudio MD, 10/20/24, 10:53 AM EDT.

## 2024-10-21 LAB
ABO GROUP BLD: NORMAL
BLD GP AB SCN SERPL QL: POSITIVE
FETAL BLEED: NEGATIVE
HCT VFR BLD AUTO: 34.4 % (ref 34–46.6)
HGB BLD-MCNC: 11.1 G/DL (ref 12–15.9)
HOLD SPECIMEN: NORMAL
NUMBER OF DOSES: ABNORMAL
RH BLD: NEGATIVE

## 2024-10-21 PROCEDURE — 25010000002 RHO D IMMUNE GLOBULIN 1500 UNIT/2ML SOLUTION PREFILLED SYRINGE: Performed by: OBSTETRICS & GYNECOLOGY

## 2024-10-21 PROCEDURE — 85014 HEMATOCRIT: CPT | Performed by: OBSTETRICS & GYNECOLOGY

## 2024-10-21 PROCEDURE — 86901 BLOOD TYPING SEROLOGIC RH(D): CPT | Performed by: OBSTETRICS & GYNECOLOGY

## 2024-10-21 PROCEDURE — 86900 BLOOD TYPING SEROLOGIC ABO: CPT | Performed by: OBSTETRICS & GYNECOLOGY

## 2024-10-21 PROCEDURE — 85018 HEMOGLOBIN: CPT | Performed by: OBSTETRICS & GYNECOLOGY

## 2024-10-21 PROCEDURE — 85461 HEMOGLOBIN FETAL: CPT | Performed by: OBSTETRICS & GYNECOLOGY

## 2024-10-21 RX ADMIN — ACETAMINOPHEN 650 MG: 325 TABLET ORAL at 12:42

## 2024-10-21 RX ADMIN — DOCUSATE SODIUM 100 MG: 100 CAPSULE, LIQUID FILLED ORAL at 20:44

## 2024-10-21 RX ADMIN — PRENATAL WITH FERROUS FUM AND FOLIC ACID 1 TABLET: 3080; 920; 120; 400; 22; 1.84; 3; 20; 10; 1; 12; 200; 27; 25; 2 TABLET ORAL at 08:30

## 2024-10-21 RX ADMIN — IBUPROFEN 600 MG: 600 TABLET, FILM COATED ORAL at 08:30

## 2024-10-21 RX ADMIN — DOCUSATE SODIUM 100 MG: 100 CAPSULE, LIQUID FILLED ORAL at 08:30

## 2024-10-21 RX ADMIN — HUMAN RHO(D) IMMUNE GLOBULIN 1500 UNITS: 1500 SOLUTION INTRAMUSCULAR; INTRAVENOUS at 14:03

## 2024-10-21 RX ADMIN — IBUPROFEN 600 MG: 600 TABLET, FILM COATED ORAL at 02:03

## 2024-10-21 RX ADMIN — ACETAMINOPHEN 650 MG: 325 TABLET ORAL at 05:44

## 2024-10-21 RX ADMIN — IBUPROFEN 600 MG: 600 TABLET, FILM COATED ORAL at 19:24

## 2024-10-21 NOTE — PROGRESS NOTES
[facility]  Vaginal Delivery Postpartum Progress Note    Patient Name: April Santos  : 2000  MRN: [id number]  Primary Care Physician:  Provider, No Known  Date of Admission: 10/20/2024    Subjective     Chief Complaint: Postpartum    Subjective:  No complatins, Pain controlled, Tolerating a regular diet, No nausea, Passing flatus, Ambulating, Urinating without difficulty, Lochia normal/improving, and No lightheadedness or dizziness    Objective     Vitals:   Temp:  [98 °F (36.7 °C)-98.6 °F (37 °C)] 98 °F (36.7 °C)  Heart Rate:  [51-89] 60  Resp:  [16-18] 16  BP: (102-133)/(58-89) 112/69    Physical Exam  General: alert and in no distress  Abdomen: Soft, Non-distended, Non-tender, Fundus firm and non-tender, and Fundal height U-2  Extremities: Trace edema    Intake/Output last 24 hours:    Intake/Output Summary (Last 24 hours) at 10/21/2024 0733  Last data filed at 10/20/2024 0917  Gross per 24 hour   Intake --   Output 200 ml   Net -200 ml       Intake/Output this shift:  No intake/output data recorded.    Labs     Lab Results (last 24 hours)       Procedure Component Value Units Date/Time    Blood Gas, Venous, Cord [[id number]] Collected: 10/20/24 0930    Specimen: Cord Blood Venous from Umbilical Cord Updated: 10/20/24 0933     pH, Cord Venous 7.362 pH Units      pCO2, Cord Venous 42.2 mm Hg      pO2, Cord Venous 25.5 mm Hg      HCO3, Cord Venous 23.9 mmol/L      Base Excess, Cord Venous -1.6 mmol/L      Comment: Serial Number: [id number]:  [id number]        O2 Sat, Cord Venous 44.1 %      Barometric Pressure for Blood Gas 754.1000 mmHg     Blood Gas, Arterial, Cord [[id number]]  (Abnormal) Collected: 10/20/24 0932    Specimen: Cord Blood Arterial from Umbilical Cord Updated: 10/20/24 0935     pH, Cord Arterial 7.35 pH Units      pCO2, Cord Arterial 43.9 mmHg      pO2, Cord Arterial 26.5 mmHg      HCO3, Cord Arterial 24.1 mmol/L      Base Exc, Cord Arterial -1.8 mmol/L      Comment: Serial  Number: [id number]:  [id number]        O2 Sat, Cord Arterial 45.6 %      Barometric Pressure for Blood Gas 755.6000 mmHg     Hemoglobin & Hematocrit, Blood [[id number]]  (Abnormal) Collected: 10/21/24 0501    Specimen: Blood from Arm, Right Updated: 10/21/24 0517     Hemoglobin 11.1 g/dL      Hematocrit 34.4 %              Assessment / Plan     Assessment:  Post-partum Day: 1   Status post      (normal spontaneous vaginal delivery)    Rh negative, antepartum    Normal labor    Plan:   Ambulate, Remove IV, Sitz baths, PO pain medications, and Perineal laceration/episiotomy care reviewed  Probable discharge in a.m. or possible later today if the baby is also discharged  Plan of care has been reviewed with patient.  All questions have been answered.    Electronically signed by Manjeet Zamudio MD, 10/21/24, 7:33 AM EDT.

## 2024-10-21 NOTE — PLAN OF CARE
Goal Outcome Evaluation:  Plan of Care Reviewed With: patient         Vital signs stable. Lochia scant/small. Voiding well. Performing self-care independently. Pain well controlled with pain medication given as ordered. Performing care of infant independently and bonding well.

## 2024-10-21 NOTE — PLAN OF CARE
Goal Outcome Evaluation:              Outcome Evaluation: patient stable and doing well. bonding well with infant. bleeding and fundus wnl. pain at acceptable level.

## 2024-10-21 NOTE — ANESTHESIA POSTPROCEDURE EVALUATION
Patient: April Santos    Procedure Summary       Date: 10/20/24 Room / Location:     Anesthesia Start: 0529 Anesthesia Stop: 0917    Procedure: LABOR ANALGESIA Diagnosis:     Scheduled Providers:  Provider: Dolly Ngo CRNA    Anesthesia Type: epidural ASA Status: 2            Anesthesia Type: epidural    Vitals  Vitals Value Taken Time   /79 10/21/24 0901   Temp 36.7 °C (98.1 °F) 10/21/24 0901   Pulse 61 10/21/24 0901   Resp 18 10/21/24 0901   SpO2 99 % 10/20/24 1249   Vitals shown include unfiled device data.        Post Anesthesia Care and Evaluation    Patient location during evaluation: bedside  Patient participation: complete - patient participated  Level of consciousness: awake  Pain score: 0  Pain management: adequate    Airway patency: patent  Anesthetic complications: No anesthetic complications  PONV Status: none  Cardiovascular status: acceptable  Respiratory status: acceptable  Hydration status: acceptable  Post Neuraxial Block status: Motor and sensory function returned to baseline and No signs or symptoms of PDPHNo anesthesia care post op

## 2024-10-22 VITALS
BODY MASS INDEX: 28.68 KG/M2 | OXYGEN SATURATION: 99 % | TEMPERATURE: 98.2 F | WEIGHT: 168 LBS | HEIGHT: 64 IN | DIASTOLIC BLOOD PRESSURE: 76 MMHG | HEART RATE: 80 BPM | RESPIRATION RATE: 18 BRPM | SYSTOLIC BLOOD PRESSURE: 129 MMHG

## 2024-10-22 RX ADMIN — IBUPROFEN 600 MG: 600 TABLET, FILM COATED ORAL at 09:08

## 2024-10-22 RX ADMIN — DOCUSATE SODIUM 100 MG: 100 CAPSULE, LIQUID FILLED ORAL at 09:06

## 2024-10-22 RX ADMIN — PRENATAL WITH FERROUS FUM AND FOLIC ACID 1 TABLET: 3080; 920; 120; 400; 22; 1.84; 3; 20; 10; 1; 12; 200; 27; 25; 2 TABLET ORAL at 09:06

## 2024-10-22 RX ADMIN — WITCH HAZEL: 500 SOLUTION RECTAL; TOPICAL at 06:33

## 2024-10-22 NOTE — PLAN OF CARE
Goal Outcome Evaluation:  Plan of Care Reviewed With: patient        Progress: improving  Outcome Evaluation: Pt pain controlled, fundus firm with scant bleeding noted, pt ambulating. voiding and tolerating PO intake,  will continue current plan of care, call light in reach

## 2024-10-22 NOTE — PROGRESS NOTES
[facility]  Vaginal Delivery Postpartum Progress Note    Patient Name: April Santos  : 2000  MRN: [id number]  Primary Care Physician:  Provider, No Known  Date of Admission: 10/20/2024    Subjective     Chief Complaint: Postpartum    Subjective:  No complatins, Pain controlled, Tolerating a regular diet, No nausea, Passing flatus, Ambulating, Urinating without difficulty, Lochia normal/improving, No lightheadedness or dizziness, and Desires discharge home    Objective     Vitals:   Temp:  [98.1 °F (36.7 °C)-98.7 °F (37.1 °C)] 98.7 °F (37.1 °C)  Heart Rate:  [61-88] 88  Resp:  [18] 18  BP: (115-120)/(75-85) 115/75    Physical Exam  General: alert and in no distress  Abdomen: Soft, Non-distended, Non-tender, and Fundus firm and non-tender  Extremities: Trace edema    Intake/Output last 24 hours:  No intake or output data in the 24 hours ending 10/22/24 0726    Intake/Output this shift:  No intake/output data recorded.    Labs     Lab Results (last 24 hours)       ** No results found for the last 24 hours. **             Assessment / Plan     Assessment:  Post-partum Day: 2   Status post      (normal spontaneous vaginal delivery)    Rh negative, antepartum    Normal labor    Plan:   Ambulate, Sitz baths, PO pain medications, Perineal laceration/episiotomy care reviewed, Discharge home, Discharge medications reviewed, Follow up scheduled, and Postpartum precautions reviewed  Plan of care has been reviewed with patient.  All questions have been answered.    Electronically signed by Manjeet Zamudio MD, 10/22/24, 7:26 AM EDT.

## 2024-10-30 VITALS
OXYGEN SATURATION: 99 % | HEART RATE: 80 BPM | BODY MASS INDEX: 28.68 KG/M2 | TEMPERATURE: 98.2 F | SYSTOLIC BLOOD PRESSURE: 129 MMHG | WEIGHT: 168 LBS | HEIGHT: 64 IN | DIASTOLIC BLOOD PRESSURE: 76 MMHG | RESPIRATION RATE: 18 BRPM

## 2024-11-22 NOTE — PROGRESS NOTES
"  POSTPARTUM Follow Up Visit      Chief Complaint   Patient presents with    Postpartum Care     HPI:      Date of delivery: 10/20/2024  Delivery type:            Perineum : Right labia minora laceration  Delivering Provider:   Dr. Manjeet Zamudio      Feeding: Bottle  Pain:  No  Vaginal Bleeding:  No  Depressed/Anxious:  No  EPDS score: 0   #10: 0  Plans for BC:  IUD, mirena   Weight at last OB OV:  168 lb.  Last pap date and result:   Last Completed Pap Smear            PAP SMEAR (Every 3 Years) Next due on 2024  IGP,CtNgTv,Apt HPV,rfx 16 / 18,45    2022  IGP,CtNgTv,rfx Aptima HPV ASCU                      The thought of harming myself has occurred to me.: 0  [place]  Depression Scale Total: 0        PHYSICAL EXAM:  /77   Pulse 69   Ht 172.7 cm (68\")   Wt 70.3 kg (155 lb)   LMP 01/10/2024 (Approximate)   Breastfeeding No   BMI 23.57 kg/m²  8.165 kg (18 lb)  General- NAD, alert and oriented, appropriate  Psych- Normal mood, good memory, good eye contact      Abdomen- Soft, non distended, non tender, no masses  Ext- No edema    ASSESSMENT AND PLAN:  Diagnoses and all orders for this visit:    1. Postpartum follow-up (Primary)      Counseling:    All birth control options reviewed in detail.  R/B/A/SE/E of each wrt pts PMHx and prior BC use  May resume normal activities  Core strengthening exercises reviewed and recommended  Kegel exercises reviewed and recommended  Ok to return to work/school once patient desires/maternity leave completed  Abstinence until IUD/Nexplanon placed, CG day of OV, as long as still abstinent CG not needed        Follow Up:  Return in about 1 week (around 2024) for Mirena IUD.          Ana Donato DO  2024    [facility]  [address] [place] [address] [address] [place] [address]  Dept: [phone]  Dept Fax: [phone]  Loc: [phone]  Loc Fax: [phone]         "

## 2024-11-25 ENCOUNTER — POSTPARTUM VISIT (OUTPATIENT)
Dept: OBSTETRICS AND GYNECOLOGY | Facility: CLINIC | Age: 24
End: 2024-11-25
Payer: COMMERCIAL

## 2024-11-25 VITALS
DIASTOLIC BLOOD PRESSURE: 77 MMHG | WEIGHT: 155 LBS | SYSTOLIC BLOOD PRESSURE: 113 MMHG | HEART RATE: 69 BPM | HEIGHT: 68 IN | BODY MASS INDEX: 23.49 KG/M2

## 2024-11-25 PROCEDURE — 0503F POSTPARTUM CARE VISIT: CPT | Performed by: STUDENT IN AN ORGANIZED HEALTH CARE EDUCATION/TRAINING PROGRAM

## 2024-12-02 ENCOUNTER — PROCEDURE VISIT (OUTPATIENT)
Dept: OBSTETRICS AND GYNECOLOGY | Facility: CLINIC | Age: 24
End: 2024-12-02
Payer: COMMERCIAL

## 2024-12-02 VITALS
HEART RATE: 79 BPM | HEIGHT: 68 IN | WEIGHT: 156 LBS | SYSTOLIC BLOOD PRESSURE: 119 MMHG | BODY MASS INDEX: 23.64 KG/M2 | DIASTOLIC BLOOD PRESSURE: 83 MMHG

## 2024-12-02 DIAGNOSIS — Z30.430 ENCOUNTER FOR IUD INSERTION: Primary | ICD-10-CM

## 2024-12-02 LAB
B-HCG UR QL: NEGATIVE
EXPIRATION DATE: NORMAL
INTERNAL NEGATIVE CONTROL: NORMAL
INTERNAL POSITIVE CONTROL: NORMAL
Lab: NORMAL

## 2024-12-02 PROCEDURE — 58300 INSERT INTRAUTERINE DEVICE: CPT | Performed by: STUDENT IN AN ORGANIZED HEALTH CARE EDUCATION/TRAINING PROGRAM

## 2024-12-02 PROCEDURE — 81025 URINE PREGNANCY TEST: CPT | Performed by: STUDENT IN AN ORGANIZED HEALTH CARE EDUCATION/TRAINING PROGRAM

## 2024-12-02 NOTE — PROGRESS NOTES
Procedures  IUD Insertion Procedure Note    Indication: Desires long acting reversible contraception     Procedure Details   Urine pregnancy test was done and was NEGATIVE .  The risks (including infection, bleeding, pain, and uterine perforation) and benefits of the procedure were explained to the patient and Written informed consent was obtained.      Cervix cleansed with Betadine. Hurricane used on cervix. Uterus sounded to 8 cm. IUD inserted without difficulty. String visible and trimmed.    IUD Information:  Mirena.    Condition:  Stable    Complications:  None    Patient tolerated the procedure well without complications.    Plan:  The patient was advised to call for any fever or for prolonged or severe pain or bleeding. She was advised to use OTC analgesics as needed for mild to moderate pain.       Ana Donato DO  12/2/2024  15:23 EST

## 2025-03-19 ENCOUNTER — OFFICE VISIT (OUTPATIENT)
Dept: OBSTETRICS AND GYNECOLOGY | Facility: CLINIC | Age: 25
End: 2025-03-19
Payer: COMMERCIAL

## 2025-03-19 VITALS
HEART RATE: 66 BPM | BODY MASS INDEX: 23.64 KG/M2 | DIASTOLIC BLOOD PRESSURE: 77 MMHG | HEIGHT: 68 IN | SYSTOLIC BLOOD PRESSURE: 125 MMHG | WEIGHT: 156 LBS

## 2025-03-19 DIAGNOSIS — Z30.431 ENCOUNTER FOR ROUTINE CHECKING OF INTRAUTERINE CONTRACEPTIVE DEVICE (IUD): Primary | ICD-10-CM

## 2025-03-19 NOTE — PROGRESS NOTES
"  Post IUD Visit      CC: IUD follow-up    HPI:  Pain/Cramping:  No  Vaginal Bleeding:  No  Pain w sex: No  Feels strings easily:  Yes    Subjective   April Santos is a 24 y.o. female, , who presents for IUD check follow up.  She had an IUD placed 3 months ago.  Her LMP was No LMP recorded. Patient has had an implant..  Since the IUD placement, the patient has not had any unusual complaints.     The additional following portions of the patient's history were reviewed and updated as appropriate: allergies, current medications, past family history, past medical history, past social history, past surgical history, and problem list.    Review of Systems  All other systems reviewed and are negative.     I have reviewed and agree with the HPI, ROS, and historical information as entered above. Irena Bryant, APRN    Objective   /77   Pulse 66   Ht 172.7 cm (67.99\")   Wt 70.8 kg (156 lb)   BMI 23.73 kg/m²     Physical Exam  Vitals reviewed.   Genitourinary:     General: Normal vulva.      Vagina: Normal. No foreign body.      Cervix: Normal.      Uterus: Normal. Not tender.       Adnexa: Right adnexa normal and left adnexa normal.        Right: No tenderness.          Left: No tenderness.        Comments: Iud strings present  Skin:     General: Skin is warm and dry.   Neurological:      Mental Status: She is alert and oriented to person, place, and time.         Assessment & Plan       Diagnoses and all orders for this visit:    1. Encounter for routine checking of intrauterine contraceptive device (IUD) (Primary)    Happy with mirena and desires to continue.     Plan     Return in about 1 year (around 3/19/2026) for Annual physical.      Irena Bryant, JL  2025    "